# Patient Record
Sex: MALE | Race: WHITE | NOT HISPANIC OR LATINO | Employment: FULL TIME | URBAN - METROPOLITAN AREA
[De-identification: names, ages, dates, MRNs, and addresses within clinical notes are randomized per-mention and may not be internally consistent; named-entity substitution may affect disease eponyms.]

---

## 2019-03-29 ENCOUNTER — TRANSFERRED RECORDS (OUTPATIENT)
Dept: HEALTH INFORMATION MANAGEMENT | Facility: CLINIC | Age: 29
End: 2019-03-29

## 2019-05-03 NOTE — TELEPHONE ENCOUNTER
FUTURE VISIT INFORMATION      FUTURE VISIT INFORMATION:    Date: 6/10/19    Time: 7:40am    Location: [facility]  REFERRAL INFORMATION:    Referring provider:  Dr. Gennaro Eid    Referring providers clinic:  [facility]    Reason for visit/diagnosis:  Chronic Throat Clearing    RECORDS REQUESTED FROM:       Clinic name Comments Records Status Imaging Status   [facility] 3/29/19 - Office Visit / Referral - Dr. Gennaro Eid  3/13/19 - EGD Received / Epic / Care Everywhere    [facility] 3/11/19 - Office Visit - Dr. Anderson Prado Care Everywhere    Other procedures Nissen fundoplication (mentioned in [facility] note) Received / Epic            5/3/19 3:21pm - Sent inViaBillet to [facility] to see if we need Nissen Fundoplication report (mentioned by referred provider).   5/3/19 10:05am - No need for Nissen fundoplication report per in basket message from Dr. Russell to Am[facility].  PP

## 2019-06-10 ENCOUNTER — ANCILLARY PROCEDURE (OUTPATIENT)
Dept: CT IMAGING | Facility: CLINIC | Age: 29
End: 2019-06-10
Attending: OTOLARYNGOLOGY
Payer: COMMERCIAL

## 2019-06-10 ENCOUNTER — PRE VISIT (OUTPATIENT)
Dept: OTOLARYNGOLOGY | Facility: CLINIC | Age: 29
End: 2019-06-10

## 2019-06-10 ENCOUNTER — OFFICE VISIT (OUTPATIENT)
Dept: OTOLARYNGOLOGY | Facility: CLINIC | Age: 29
End: 2019-06-10
Payer: COMMERCIAL

## 2019-06-10 VITALS
DIASTOLIC BLOOD PRESSURE: 67 MMHG | WEIGHT: 211 LBS | BODY MASS INDEX: 24.41 KG/M2 | HEIGHT: 78 IN | SYSTOLIC BLOOD PRESSURE: 115 MMHG

## 2019-06-10 DIAGNOSIS — R49.0 DYSPHONIA: Primary | ICD-10-CM

## 2019-06-10 DIAGNOSIS — R49.0 DYSPHONIA: ICD-10-CM

## 2019-06-10 DIAGNOSIS — R51.9 FACIAL PAIN: ICD-10-CM

## 2019-06-10 DIAGNOSIS — R09.89 THROAT CLEARING: Primary | ICD-10-CM

## 2019-06-10 DIAGNOSIS — J38.7 IRRITABLE LARYNX SYNDROME: ICD-10-CM

## 2019-06-10 DIAGNOSIS — J38.7 IRRITABLE LARYNX: ICD-10-CM

## 2019-06-10 DIAGNOSIS — R09.89 THROAT CLEARING: ICD-10-CM

## 2019-06-10 RX ORDER — IPRATROPIUM BROMIDE 42 UG/1
2 SPRAY, METERED NASAL
Status: ON HOLD | COMMUNITY
Start: 2019-04-23 | End: 2021-05-07

## 2019-06-10 RX ORDER — MOMETASONE FUROATE MONOHYDRATE 50 UG/1
2 SPRAY, METERED NASAL
COMMUNITY
Start: 2019-04-23

## 2019-06-10 ASSESSMENT — MIFFLIN-ST. JEOR: SCORE: 2076.22

## 2019-06-10 NOTE — PATIENT INSTRUCTIONS
"After Visit Summary    Patient: Royal Hernadez  Date of Visit: 6/10/2019    Hygiene:     Systemic Hydration: internal hydration of the entire body  o Continue to sip water regularly      Topical Hydration: hydration for the surface mucosa of the larynx and vocal folds.    Please follow strategies learned on the \"Tips for Topical Hydration\" handout    Nasal Irrigation/Nasal Spray  o Continue sinus machine   o Saline spray (after running): 3 puffs in each nostril; sniff and then blow your nose    Gargling  o (gargle after running with water)  o Saline and plain water (see protocol below)    Throat clearing suppression:     Sip of water     Gargle  o With a voice  o Tilt your head side to side  o Cade chirp -  carmenkaaa carmenkaaa     Swallow    breathe + swallow (before speaking)    Breathe in through rounded lips + out with a repeated  sh  (angry ) + swallow    Suck on a lozenge with Pectin (avoid mint or menthol) or a sugar-free candy, gum, \"wet\" snacks (apples, pineapple, grapes, etc).  Also consider Xylitol products, like the Spry brand of lozenges, gum, spray    Puppy Sniffs - 2-3 small quick sniffs through the nose and exhale with  sh     massage       Kary Nagel M.M. (voice), M.A., CCC/SLP  Speech-Language Pathologist  Certificate of Vocology  [facility]  [phone]  [email or web address]  Prounouns: she/her    "

## 2019-06-10 NOTE — LETTER
6/10/2019      RE: Royal Hernadez  [address] [facility] [address]       Dear Dr. Eid:    I had the pleasure of meeting Royal Hernadez in consultation at the [facility] of the [facility] at your request, for evaluation of dysphonia, chronic cough and chronic throat-clearing. The note from our visit follows. Speech recognition software may have been used in the documentation below; input is reviewed before signature to the best of my ability. I appreciate the opportunity to participate in the care of this pleasant patient.    Please feel free to contact me with any questions.    Sincerely yours,    Jess Russell M.D., M.P.H.  , Laryngology  Director, [facility]  Otolaryngology- Head & Neck Surgery  [phone]          =====    History of Present Illness:   Royal Hernadez is a pleasant 29-year-old male with a history of anxiety and chronic rhinitis who presents with a lifelong history of dysphonia, chronic cough and chronic throat-clearing. Symptoms include increased effort to talk/sing, throat irritation, lump in throat, mucus in throat, frequent throat-clearing, poor voice quality and change in vocal pitch.      Voice    He reports a lifelong history of voice problems, possibly related to allergies.  Symptoms are present most of the time.  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   He considers this to be a very big problem for him.  He does feel that his voice is sometimes normal.       Swallowing  No concerns.       Cough/Throat-clearing  He also reports a lifelong history of cough/throat clearing.  He has previously been on reflux medications.  Endoscopy showed grade A esophagitis and a small hiatal hernia.  He feels like post-nasal drip contributes.  Cold air, sweets, caffeine, and alcohol also are triggers for him.   Nasal irrigations have not helped, but Nasonex helps somewhat with  nasal symptoms.  He often has bilateral facial pressure as well.  He drinks a lot of water and does not have a dry mouth.  His symptoms are preceded by a tickle.  They are mostly controllable.       Breathing  No concerns.       Throat discomfort  No concerns.       Reflux-type symptoms  He experiences heartburn/indigestion rarely. He is not taking reflux medications anymore.      Prior outside records were reviewed for this visit.      MEDICATIONS:     Current Outpatient Medications   Medication Sig Dispense Refill     ClonazePAM (KLONOPIN PO) Take 2 mg by mouth 2 times daily as needed.       cyclobenzaprine (FLEXERIL) 10 MG tablet Take 1 tablet by mouth 3 times daily as needed for muscle spasms. 20 tablet 0     GINKGO BILOBA MEMORY ENHANCER PO Take  by mouth.       ipratropium (ATROVENT) 0.06 % nasal spray Spray 2 sprays in nostril       mometasone (NASONEX) 50 MCG/ACT nasal spray Spray 2 sprays in nostril           ALLERGIES:    Allergies   Allergen Reactions     Methocarbamol Hives       PAST MEDICAL HISTORY:   Past Medical History:   Diagnosis Date     Anxiety      Gastro-oesophageal reflux disease         PAST SURGICAL HISTORY:   Past Surgical History:   Procedure Laterality Date     HERNIA REPAIR      at age 15       HABITS/SOCIAL HISTORY:    Social History     Tobacco Use     Smoking status: Never Smoker   Substance Use Topics     Alcohol use: Yes       FAMILY HISTORY:  No family history on file.   Father had Wilkes's esophagus.    REVIEW OF SYSTEMS:  The patient completed a comprehensive 11 point review of systems (below), which was reviewed. Positives are as noted below; pertinent findings are as noted in the HPI.     Patient Supplied Answers to Review of Systems   ENT ROS 6/10/2019   Constitutional Problems with sleep   Psychology Frequently feeling anxious   Ears, Nose, Throat Nasal congestion or drainage, Hoarseness   Allergy/Immunology Allergies or hay fever   Endocrine Thirst, Heat or cold  intolerance       PHYSICAL EXAMINATION:  General: The patient was alert and conversant, and in no acute distress.    Eyes: PERRL, conjunctiva and lids normal, sclera nonicteric.  Nose: Anterior rhinoscopy: no gross abnormalities. no  bleeding; no  mucopurulence; septum grossly normal, mild mucoid drainage and/or crusting.  Oral cavity/oropharynx: No masses or lesions. Dentition in good condition. Floor of mouth and oral tongue soft to palpation. Tongue mobility and palate elevation intact and symmetric.  Ears: Normal auricles, external auditory canals bilaterally. Visualized portions of tympanic membranes normal bilaterally.   Neck: No palpable cervical lymphadenopathy. There was no significant tenderness to palpation of the thyrohyoid space, which was mildly narrow. No obvious thyroid abnormality. Landmarks palpable.  Resp: Breathing comfortably, no stridor or stertor.  Neuro: Symmetric facial function. Other cranial nerves as documented above.  Psych: Normal affect, pleasant and cooperative.  Voice/speech: Moderate dysphonia characterized by essentially constant glottal batista.  Extremities: No cyanosis, clubbing, or edema of the upper extremities.    Intake scores  Total Score for Last Patient-Answered VHI Questionnaire  VHI Total Score 6/10/2019   VHI Total Score 16     Total Score for Last Patient-Answered EAT Questionnaire  EAT Total Score 6/10/2019   EAT Total Score 0     Total Score for Last Patient-Answered CSI Questionnaire  CSI Total Score 6/10/2019   CSI Total Score 8       PROCEDURE:   Flexible fiberoptic laryngoscopy and laryngovideostroboscopy  Indications: This procedure was warranted to evaluate the patient's laryngeal anatomy and function. Risks, benefits, and alternatives were discussed.  Description: After written informed consent was obtained, a time-out was performed to confirm patient identity, procedure, and procedure site. Topical 3% lidocaine with 0.25% phenylephrine was applied to the nasal  cavities. I performed the endoscopy and no complications were apparent. Continuous and stroboscopic light were utilized to assess routine phonation and variable frequency phonation.  Performed by: Jess Russell MD MPH  SLP: Kary Nagel MM, MA, CCC-SLP   Findings: Normal nasopharynx. Normal base of tongue, valleculae, and epiglottis. Vocal fold mobility: right: normal; left: normal. Medial edges of the vocal folds: smooth and straight. No focal mucosal lesions were observed on the true vocal folds. Glissade produced appropriate elongation. There was moderate to severe supraglottic recruitment with connected speech. Mucosa of false vocal folds, aryepiglottic folds, piriform sinuses, and posterior glottis unremarkable. Airway was patent. Response to the therapy probes was good. No focal lesions on NBI. Sticky scattered secretions.    The addition of stroboscopy allowed evaluation of the mucosal wave.   Amplitude: right: normal; left: normal. Symmetry: intermittent symmetry. Closure pattern: complete. Closure plane: at glottic level. Phase distribution: normal.        IMPRESSION AND PLAN:   Royal Hernadez presents with facial pressure as well as irritable larynx and muscle tension dysphonia. Upper endoscopy showed mild esophagitis. Symptoms have not responded to reflux management.    Recommendations:  1) I recommended speech therapy as initial primary management for dysphonia/ throat-clearing/ cough, with goals including reducing laryngeal irritability, improving laryngeal efficiency and improving respiratory/phonatory coordination.   2) Discussed consideration of sinus CT scan given the long history of symptoms and facial pressure.  He understands that a small amount of radiation is involved, but would like to proceed given the discomfort he experiences. We discussed that if there are any significant findings I may refer to a sinus specialty colleague.    He will return as needed. I appreciate the opportunity to  participate in the care of this pleasant patient.             Jess Russell MD

## 2019-06-10 NOTE — PROGRESS NOTES
Dear Dr. Eid:    I had the pleasure of meeting Royal Hernadez in consultation at the [facility] of the [facility] at your request, for evaluation of dysphonia, chronic cough and chronic throat-clearing. The note from our visit follows. Speech recognition software may have been used in the documentation below; input is reviewed before signature to the best of my ability. I appreciate the opportunity to participate in the care of this pleasant patient.    Please feel free to contact me with any questions.    Sincerely yours,    Jess Russell M.D., M.P.H.  , Laryngology  Director, [facility]  Otolaryngology- Head & Neck Surgery  [phone]          =====    History of Present Illness:   Royal Hernadez is a pleasant 29-year-old male with a history of anxiety and chronic rhinitis who presents with a lifelong history of dysphonia, chronic cough and chronic throat-clearing. Symptoms include increased effort to talk/sing, throat irritation, lump in throat, mucus in throat, frequent throat-clearing, poor voice quality and change in vocal pitch.      Voice    He reports a lifelong history of voice problems, possibly related to allergies.  Symptoms are present most of the time.  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   He considers this to be a very big problem for him.  He does feel that his voice is sometimes normal.       Swallowing  No concerns.       Cough/Throat-clearing  He also reports a lifelong history of cough/throat clearing.  He has previously been on reflux medications.  Endoscopy showed grade A esophagitis and a small hiatal hernia.  He feels like post-nasal drip contributes.  Cold air, sweets, caffeine, and alcohol also are triggers for him.   Nasal irrigations have not helped, but Nasonex helps somewhat with nasal symptoms.  He often has bilateral facial pressure as well.  He drinks a lot of  water and does not have a dry mouth.  His symptoms are preceded by a tickle.  They are mostly controllable.       Breathing  No concerns.       Throat discomfort  No concerns.       Reflux-type symptoms  He experiences heartburn/indigestion rarely. He is not taking reflux medications anymore.      Prior outside records were reviewed for this visit.      MEDICATIONS:     Current Outpatient Medications   Medication Sig Dispense Refill     ClonazePAM (KLONOPIN PO) Take 2 mg by mouth 2 times daily as needed.       cyclobenzaprine (FLEXERIL) 10 MG tablet Take 1 tablet by mouth 3 times daily as needed for muscle spasms. 20 tablet 0     GINKGO BILOBA MEMORY ENHANCER PO Take  by mouth.       ipratropium (ATROVENT) 0.06 % nasal spray Spray 2 sprays in nostril       mometasone (NASONEX) 50 MCG/ACT nasal spray Spray 2 sprays in nostril           ALLERGIES:    Allergies   Allergen Reactions     Methocarbamol Hives       PAST MEDICAL HISTORY:   Past Medical History:   Diagnosis Date     Anxiety      Gastro-oesophageal reflux disease         PAST SURGICAL HISTORY:   Past Surgical History:   Procedure Laterality Date     HERNIA REPAIR      at age 15       HABITS/SOCIAL HISTORY:    Social History     Tobacco Use     Smoking status: Never Smoker   Substance Use Topics     Alcohol use: Yes       FAMILY HISTORY:  No family history on file.   Father had Wilkes's esophagus.    REVIEW OF SYSTEMS:  The patient completed a comprehensive 11 point review of systems (below), which was reviewed. Positives are as noted below; pertinent findings are as noted in the HPI.     Patient Supplied Answers to Review of Systems   ENT ROS 6/10/2019   Constitutional Problems with sleep   Psychology Frequently feeling anxious   Ears, Nose, Throat Nasal congestion or drainage, Hoarseness   Allergy/Immunology Allergies or hay fever   Endocrine Thirst, Heat or cold intolerance       PHYSICAL EXAMINATION:  General: The patient was alert and conversant, and  in no acute distress.    Eyes: PERRL, conjunctiva and lids normal, sclera nonicteric.  Nose: Anterior rhinoscopy: no gross abnormalities. no  bleeding; no  mucopurulence; septum grossly normal, mild mucoid drainage and/or crusting.  Oral cavity/oropharynx: No masses or lesions. Dentition in good condition. Floor of mouth and oral tongue soft to palpation. Tongue mobility and palate elevation intact and symmetric.  Ears: Normal auricles, external auditory canals bilaterally. Visualized portions of tympanic membranes normal bilaterally.   Neck: No palpable cervical lymphadenopathy. There was no significant tenderness to palpation of the thyrohyoid space, which was mildly narrow. No obvious thyroid abnormality. Landmarks palpable.  Resp: Breathing comfortably, no stridor or stertor.  Neuro: Symmetric facial function. Other cranial nerves as documented above.  Psych: Normal affect, pleasant and cooperative.  Voice/speech: Moderate dysphonia characterized by essentially constant glottal batista.  Extremities: No cyanosis, clubbing, or edema of the upper extremities.    Intake scores  Total Score for Last Patient-Answered VHI Questionnaire  VHI Total Score 6/10/2019   VHI Total Score 16     Total Score for Last Patient-Answered EAT Questionnaire  EAT Total Score 6/10/2019   EAT Total Score 0     Total Score for Last Patient-Answered CSI Questionnaire  CSI Total Score 6/10/2019   CSI Total Score 8       PROCEDURE:   Flexible fiberoptic laryngoscopy and laryngovideostroboscopy  Indications: This procedure was warranted to evaluate the patient's laryngeal anatomy and function. Risks, benefits, and alternatives were discussed.  Description: After written informed consent was obtained, a time-out was performed to confirm patient identity, procedure, and procedure site. Topical 3% lidocaine with 0.25% phenylephrine was applied to the nasal cavities. I performed the endoscopy and no complications were apparent. Continuous and  stroboscopic light were utilized to assess routine phonation and variable frequency phonation.  Performed by: Jess Russell MD MPH  SLP: Kary Nagel MM, MA, CCC-SLP   Findings: Normal nasopharynx. Normal base of tongue, valleculae, and epiglottis. Vocal fold mobility: right: normal; left: normal. Medial edges of the vocal folds: smooth and straight. No focal mucosal lesions were observed on the true vocal folds. Glissade produced appropriate elongation. There was moderate to severe supraglottic recruitment with connected speech. Mucosa of false vocal folds, aryepiglottic folds, piriform sinuses, and posterior glottis unremarkable. Airway was patent. Response to the therapy probes was good. No focal lesions on NBI. Sticky scattered secretions.    The addition of stroboscopy allowed evaluation of the mucosal wave.   Amplitude: right: normal; left: normal. Symmetry: intermittent symmetry. Closure pattern: complete. Closure plane: at glottic level. Phase distribution: normal.        IMPRESSION AND PLAN:   Royal Hernadez presents with facial pressure as well as irritable larynx and muscle tension dysphonia. Upper endoscopy showed mild esophagitis. Symptoms have not responded to reflux management.    Recommendations:  1) I recommended speech therapy as initial primary management for dysphonia/ throat-clearing/ cough, with goals including reducing laryngeal irritability, improving laryngeal efficiency and improving respiratory/phonatory coordination.   2) Discussed consideration of sinus CT scan given the long history of symptoms and facial pressure.  He understands that a small amount of radiation is involved, but would like to proceed given the discomfort he experiences. We discussed that if there are any significant findings I may refer to a sinus specialty colleague.    He will return as needed. I appreciate the opportunity to participate in the care of this pleasant patient.

## 2019-06-10 NOTE — PATIENT INSTRUCTIONS
Thank you for choosing [facility] today.    Please follow-up with Dr. Russell as needed.   Dr. Russell recommends speech therapy.     If you have any further questions or concerns, call us at [phone].

## 2019-06-10 NOTE — PROGRESS NOTES
"[facility]  Yong Rivera Jr., M.D., F.A.C.S.  Jess Russell M.D., M.P.H.  Nova Melendrez, Ph.D., CCC/SLP  Kary Nagel M.M. (voice), M.A., CCC/SLP  Jeremie Lai M.M. (voice), M.A., CCC/SLP    Evaluation report    Clinician: Kary Nagel M.M. (voice), M.A., CCC/SLP  Seen in conjunction with: Dr. Russell  Referring physician:  St[place]  Patient: Royal Hernadez  Date of Visit: 6/10/2019    HISTORY  Chief complaint: Royal Hernadez is a 29 year old presenting today for evaluation of voice and throat isues.  Salient history: He has a history significant for post nasal drainage, and throat issues since he was very young.    CURRENT SYMPTOMS INCLUDE  VOICE    Vibrates more, and there is a roughness of the tone    Difficulty keeping the voice \"smooth\"    THROAT ISSUES    Never seen a speech-language pathologist; most of the time he has been treating his sinuses.    Denies throat tightness, or irritation     COUGH:  o Never very severe.  Mostly within normal limits  o his cough triggers include:  - Denies triggers    THROAT CLEARING:  o Mostly dry  o Frequent.  Has different versions - worst in the AM, then unpredictable.  o Occasionally, he will notice that he needs to clear more frequently than others.  o sinupulse in the morning.  o his throat clearing triggers include:  - Allergens  - Talking  - Dryness in the morning  - Sweets, caffeine, alcohol (makes sinuses worse, and then throat clearing in the AM)  - Caffeine also makes it worse.    GLOBUS:  o Usually feels like mucus is stuck in the throat; sensation comes and goes.   o The sensation is most often mild, mild-mod; never severe    SWALLOWING    Denies issues    RESPIRATION    Asthma as a child, but he denies any issues.    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    OTHER PERTINENT HISTORY    Otherwise unremarkable.  Please also refer to Dr. Russell's dictation.     Past Medical History:   Diagnosis Date     Anxiety      Gastro-oesophageal " "reflux disease      Past Surgical History:   Procedure Laterality Date     HERNIA REPAIR      at age 15       OBJECTIVE  PATIENT REPORTED MEASURES    Effort to talk: 5 / 10 (0-10 in which 10 represents maximal effort)    Effort to sing: n/a / 10 (0-10 in which 10 represents maximal effort)    Voice quality: 4 / 10 (0-10 in which 10 represents best possible voice)  Patient Supplied Answers To VHI Questionnaire  Voice Handicap Index (VHI-10) 6/10/2019   My voice makes it difficult for people to hear me 2   People have difficulty understanding me in a noisy room 1   My voice difficulties restrict my personal and social life.  2   I feel left out of conversations because of my voice 0   My voice problem causes me to lose income 1   I feel as though I have to strain to produce voice 2   The clarity of my voice is unpredictable 3   My voice problem upsets me 2   My voice makes me feel handicapped 1   People ask, \"What's wrong with your voice?\" 2   VHI-10 16       Patient Supplied Answers To CSI Questionnaire  Cough Severity Index (CSI) 6/10/2019   My cough is worse when I lie down 1   My coughing problem causes me to restrict my personal and social life 1   I tend to avoid places because of my cough problem 1   I feel embarrassed because of my coughing problem 2   People ask, ''What's wrong?'' because I cough a lot 3   I run out of air when I cough 0   My coughing problem affects my voice 2   My coughing problem limits my physical activity 1   My coughing problem upsets me 2   People ask me if I am sick because I cough a lot 2   CSI Score 15       Patient Supplied Answers To EAT Questionnaire  Eating Assessment Tool (EAT-10) 6/10/2019   My swallowing problem has caused me to lose weight 0   My swallowing problem interferes with my ability to go out for meals 0   Swallowing liquids takes extra effort 0   Swallowing solids takes extra effort 0   Swallowing pills takes extra effort 0   Swallowing is painful 0   The pleasure " of eating is affected by my swallowing 0   When I swallow food sticks in my throat 0   I cough when I eat 0   Swallowing is stressful 0   EAT-10 0       PERCEPTUAL EVALUATION (CPT 02770)  POSTURE / TENSION:     WNL    BREATHING:     appears within normal limits and adequate     clavicular elevation on inspiration    clavicular muscle use pattern     LARYNGEAL PALPATION:     supple musculature    no significant tenderness    VOICE:    Roughness: Mild to moderate    Breathiness: WNL    Strain: Mild     Glottal batista    Occasional pitch instability/ mild voice breaks.    Loudness    Conversational speech:  WNL    Projected speech:  WNL    Pitch:    Conversational speech:  WNL    Pitch glide: neurologically normal    Resonance:    Conversational speech:  laryngeal pharyngeal resonance    Singing vs. Speech:  n/a    CAPE-V Overall Severity:  30/100    COUGH/THROAT CLEARING:    THROAT CLEARING    Frequent    Dry    LARYNGEAL FUNCTION STUDIES (CPT 74366)  Not warranted    LARYNGEAL EXAMINATION  Procedure: Flexible endoscopy with chip-tip technology without stroboscopy, right nostril; topical anesthesia with 3% Lidocaine and 0.25% phenylephrine was applied.   Performed by: Dr. Russell   The laryngeal and pharyngeal structures were evaluated for gross appearance, mobility, function, and focal lesions / abnormalities of the associated mucosa.    All findings were within normal limits with the exception of the following salient features:     Mild to moderate thickened secretions; L>R    Moderate to severe supraglottic hyperfunction during connected speech.    THERAPY PROBES: Improvement was elicited with use of forward resonant stimuli, coordination of respiration and phonation and use of yawn sigh      Abducted view of the vocal folds.    The laryngeal exam was reviewed with  Satya, and I provided pertinent explanations, as well as written and oral information.    ASSESSMENT / PLAN  IMPRESSIONS: Royal Hernadez is a 29 year old  male who works in , presenting today with R49.0 (Dysphonia) and R68.89 (Chronic Throat Clearing), as evidenced by evaluation and the laryngeal exam.  Remarkable findings of the evaluations included mild to moderate persistent thickened secretions on the surface of the vocal folds (L> R)moderate to severe supraglottic hyperfunction, and possible intermittent level difference noted during adduction; all indicators of significant muscle tension and imbalance that could be contributing to his throat clearing issues.   Dysphonia/discomfort is accounted for by the hyperfunction and imbalanced function of the intrinsic and extrinsic laryngeal musculature  .    STIMULABILITY: results of therapy probes during perceptual and laryngeal evaluation demonstrate improvement with use of forward resonant stimuli, coordination of respiration and phonation and use of yawn sigh    RECOMMENDATIONS:     A course of speech therapy is recommended to optimize vocal technique, promote reduced discomfort, effort and fatigue and help reduce throat clear and mucosal irritation.    He demonstrates a Good prognosis for improvement given adherence to therapeutic recommendations.     Positive indicators: positive response to therapy probes diagnosis is known to respond to treatment    Negative indicators: none    DURATION / FREQUENCY: 3 one-hour sessions.  A total of 6-8 sessions may be necessary.    GOALS:  Patient goal:   To improve and maintain a healthy voice quality  To understand the problem and fix it as much as possible  To reduce his throat clearing to acceptable levels    Short-term goal(s): Within the first 4 sessions, Mr. Hernadez:  1. will demonstrate improved awareness of throat clearing / cough: acknowledging >75% of all cough events during session time with no clinician support  2. will be able to demonstrate provided throat clearing suppression and substitution strategies from memory independently with 90%  accuracy  3. will demonstrate semi-occluded vocal tract (SOVT) exercises with at least 80% accuracy with no clinician support    Long-term goal(s): In 6 months, Mr. Hernadez will:  1. Report a week of typical vocal activities, in which dysphonia that does not exceed a level of 3 out of 10, 80% of the time   2. Report a week with no more than episodes of coughing, that do not last more than 2 seconds  3. Report resolution of dysphonia, and use of optimal voice quality to meet personal, social, and professional needs, 80% of the time during a typical week of vocal activities  This treatment plan was developed with the patient who agreed with the recommendations.    _______________________________________________________________________  THERAPY NOTE (CPT 61285)  Date of Service: 6/10/2019    SUBJECTIVE / OBJECTIVE:  Please refer to my evaluation report from today's encounter for full details regarding subjective data, patient reported measures, and diagnostic findings.    THERAPEUTIC ACTIVITIES  Exercises and techniques for optimal vocal hygiene including:    Systemic hydration, including strategies for increasing daily water intake    Topical hydration - Gargling (saline and plain water), saline nasal irrigation, humidification, steam, guaifenesin to reduce the thickened secretions / laryngeal irritation.    Awareness and reduction of phonotraumatic behaviors    Moderating voice use    Substituting non-voice alternative behaviors    Avoiding cough and throat clearing    Throat clearing reduction therapy    he is most bothered by: a dry, persistent throat clear    Suppression and substitution strategies were instructed including    Swallowing substitution techniques    Breathing suppression techniques to reduce laryngeal tension    Low impact glottic coup and soft cough    Techniques to raise awareness of habitual throat clearing    Counseling and Education    Asked many questions about the nature of his symptoms, and I  answered all of these thoroughly.    Concepts of an optimal regimen for practice were instructed.  o He should use an interval schedule of practice, with brief periods of practice frequently throughout each day  o [employer] concepts of volitional practice to facilitate motor learning.    I provided an after visit summary and handouts of today's therapeutic activities to facilitate practice.    ASSESSMENT/PLAN  PROGRESS TOWARD LONG TERM GOALS:   Minimal at this point, as this is first session, but good learning today    IMPRESSIONS: R49.0 (Dysphonia) and R68.89 (Chronic Throat Clearing), and irritable larynx      PLAN: I will see Mr. Hernadez in early July for therapy    TOTAL SERVICE TIME: 60 minutes  EVALUATION OF VOICE AND RESONANCE (44053)  TREATMENT (60981)  NO CHARGE FACILITY FEE (80318)    Kary Nagel M.M. (voice), M.A., CCC/SLP  Speech-Language Pathologist  [facility] Trained Vocologist  [facility]  [phone]  [email or web address]  Prounouns: she/her

## 2019-06-10 NOTE — LETTER
"6/10/2019       RE: Royal Hernadez  [address]  [place] [address]     Dear Colleague,    Thank you for referring your patient, Royal Hernadez, to the [facility]. Please see a copy of my visit note below.    [facility] VOICE [facility]  Yong Rivera Jr., M.D., F.A.C.S.  Jess Russell M.D., M.P.H.  Nova Melendrez, Ph.D., CCC/SLP  Kary Nagel M.M. (voice), M.A., CCC/SLP  Jeremie Lai M.M. (voice), M.A., CCC/SLP    Evaluation report    Clinician: Kary Nagel M.M. (voice), M.A., CCC/SLP  Seen in conjunction with: Dr. Russell  Referring physician:  St[place]  Patient: Royal Hernadez  Date of Visit: 6/10/2019    HISTORY  Chief complaint: Royal Hernadez is a 29 year old presenting today for evaluation of voice and throat isues.  Salient history: He has a history significant for post nasal drainage, and throat issues since he was very young.    CURRENT SYMPTOMS INCLUDE  VOICE    Vibrates more, and there is a roughness of the tone    Difficulty keeping the voice \"smooth\"    THROAT ISSUES    Never seen a speech-language pathologist; most of the time he has been treating his sinuses.    Denies throat tightness, or irritation     COUGH:  o Never very severe.  Mostly within normal limits  o his cough triggers include:  - Denies triggers    THROAT CLEARING:  o Mostly dry  o Frequent.  Has different versions - worst in the AM, then unpredictable.  o Occasionally, he will notice that he needs to clear more frequently than others.  o sinupulse in the morning.  o his throat clearing triggers include:  - Allergens  - Talking  - Dryness in the morning  - Sweets, caffeine, alcohol (makes sinuses worse, and then throat clearing in the AM)  - Caffeine also makes it worse.    GLOBUS:  o Usually feels like mucus is stuck in the throat; sensation comes and goes.   o The sensation is most often mild, mild-mod; never severe    SWALLOWING    Denies issues    RESPIRATION    Asthma as a " "child, but he denies any issues.    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    OTHER PERTINENT HISTORY    Otherwise unremarkable.  Please also refer to Dr. Russell's dictation.     Past Medical History:   Diagnosis Date     Anxiety      Gastro-oesophageal reflux disease      Past Surgical History:   Procedure Laterality Date     HERNIA REPAIR      at age 15       OBJECTIVE  PATIENT REPORTED MEASURES    Effort to talk: 5 / 10 (0-10 in which 10 represents maximal effort)    Effort to sing: n/a / 10 (0-10 in which 10 represents maximal effort)    Voice quality: 4 / 10 (0-10 in which 10 represents best possible voice)  Patient Supplied Answers To VHI Questionnaire  Voice Handicap Index (VHI-10) 6/10/2019   My voice makes it difficult for people to hear me 2   People have difficulty understanding me in a noisy room 1   My voice difficulties restrict my personal and social life.  2   I feel left out of conversations because of my voice 0   My voice problem causes me to lose income 1   I feel as though I have to strain to produce voice 2   The clarity of my voice is unpredictable 3   My voice problem upsets me 2   My voice makes me feel handicapped 1   People ask, \"What's wrong with your voice?\" 2   VHI-10 16       Patient Supplied Answers To CSI Questionnaire  Cough Severity Index (CSI) 6/10/2019   My cough is worse when I lie down 1   My coughing problem causes me to restrict my personal and social life 1   I tend to avoid places because of my cough problem 1   I feel embarrassed because of my coughing problem 2   People ask, ''What's wrong?'' because I cough a lot 3   I run out of air when I cough 0   My coughing problem affects my voice 2   My coughing problem limits my physical activity 1   My coughing problem upsets me 2   People ask me if I am sick because I cough a lot 2   CSI Score 15       Patient Supplied Answers To EAT Questionnaire  Eating Assessment Tool (EAT-10) 6/10/2019   My swallowing problem has " caused me to lose weight 0   My swallowing problem interferes with my ability to go out for meals 0   Swallowing liquids takes extra effort 0   Swallowing solids takes extra effort 0   Swallowing pills takes extra effort 0   Swallowing is painful 0   The pleasure of eating is affected by my swallowing 0   When I swallow food sticks in my throat 0   I cough when I eat 0   Swallowing is stressful 0   EAT-10 0       PERCEPTUAL EVALUATION (CPT 86485)  POSTURE / TENSION:     WNL    BREATHING:     appears within normal limits and adequate     clavicular elevation on inspiration    clavicular muscle use pattern     LARYNGEAL PALPATION:     supple musculature    no significant tenderness    VOICE:    Roughness: Mild to moderate    Breathiness: WNL    Strain: Mild     Glottal batista    Occasional pitch instability/ mild voice breaks.    Loudness    Conversational speech:  WNL    Projected speech:  WNL    Pitch:    Conversational speech:  WNL    Pitch glide: neurologically normal    Resonance:    Conversational speech:  laryngeal pharyngeal resonance    Singing vs. Speech:  n/a    CAPE-V Overall Severity:  30/100    COUGH/THROAT CLEARING:    THROAT CLEARING    Frequent    Dry    LARYNGEAL FUNCTION STUDIES (CPT 83010)  Not warranted    LARYNGEAL EXAMINATION  Procedure: Flexible endoscopy with chip-tip technology without stroboscopy, right nostril; topical anesthesia with 3% Lidocaine and 0.25% phenylephrine was applied.   Performed by: Dr. Russell   The laryngeal and pharyngeal structures were evaluated for gross appearance, mobility, function, and focal lesions / abnormalities of the associated mucosa.    All findings were within normal limits with the exception of the following salient features:     Mild to moderate thickened secretions; L>R    Moderate to severe supraglottic hyperfunction during connected speech.    THERAPY PROBES: Improvement was elicited with use of forward resonant stimuli, coordination of respiration and  phonation and use of yawn sigh      Abducted view of the vocal folds.    The laryngeal exam was reviewed with Mr. Hernadez, and I provided pertinent explanations, as well as written and oral information.    ASSESSMENT / PLAN  IMPRESSIONS: Royal Hernadez is a 29 year old male who works in , presenting today with R49.0 (Dysphonia) and R68.89 (Chronic Throat Clearing), as evidenced by evaluation and the laryngeal exam.  Remarkable findings of the evaluations included mild to moderate persistent thickened secretions on the surface of the vocal folds (L> R)moderate to severe supraglottic hyperfunction, and possible intermittent level difference noted during adduction; all indicators of significant muscle tension and imbalance that could be contributing to his throat clearing issues.   Dysphonia/discomfort is accounted for by the hyperfunction and imbalanced function of the intrinsic and extrinsic laryngeal musculature  .    STIMULABILITY: results of therapy probes during perceptual and laryngeal evaluation demonstrate improvement with use of forward resonant stimuli, coordination of respiration and phonation and use of yawn sigh    RECOMMENDATIONS:     A course of speech therapy is recommended to optimize vocal technique, promote reduced discomfort, effort and fatigue and help reduce throat clear and mucosal irritation.    He demonstrates a Good prognosis for improvement given adherence to therapeutic recommendations.     Positive indicators: positive response to therapy probes diagnosis is known to respond to treatment    Negative indicators: none    DURATION / FREQUENCY: 3 one-hour sessions.  A total of 6-8 sessions may be necessary.    GOALS:  Patient goal:   To improve and maintain a healthy voice quality  To understand the problem and fix it as much as possible  To reduce his throat clearing to acceptable levels    Short-term goal(s): Within the first 4 sessions, Mr. Hernadez:  1. will demonstrate improved  awareness of throat clearing / cough: acknowledging >75% of all cough events during session time with no clinician support  2. will be able to demonstrate provided throat clearing suppression and substitution strategies from memory independently with 90% accuracy  3. will demonstrate semi-occluded vocal tract (SOVT) exercises with at least 80% accuracy with no clinician support    Long-term goal(s): In 6 months, Mr. Hernadez will:  1. Report a week of typical vocal activities, in which dysphonia that does not exceed a level of 3 out of 10, 80% of the time   2. Report a week with no more than episodes of coughing, that do not last more than 2 seconds  3. Report resolution of dysphonia, and use of optimal voice quality to meet personal, social, and professional needs, 80% of the time during a typical week of vocal activities  This treatment plan was developed with the patient who agreed with the recommendations.    _______________________________________________________________________  THERAPY NOTE (CPT 32111)  Date of Service: 6/10/2019    SUBJECTIVE / OBJECTIVE:  Please refer to my evaluation report from today's encounter for full details regarding subjective data, patient reported measures, and diagnostic findings.    THERAPEUTIC ACTIVITIES  Exercises and techniques for optimal vocal hygiene including:    Systemic hydration, including strategies for increasing daily water intake    Topical hydration - Gargling (saline and plain water), saline nasal irrigation, humidification, steam, guaifenesin to reduce the thickened secretions / laryngeal irritation.    Awareness and reduction of phonotraumatic behaviors    Moderating voice use    Substituting non-voice alternative behaviors    Avoiding cough and throat clearing    Throat clearing reduction therapy    he is most bothered by: a dry, persistent throat clear    Suppression and substitution strategies were instructed including    Swallowing substitution  techniques    Breathing suppression techniques to reduce laryngeal tension    Low impact glottic coup and soft cough    Techniques to raise awareness of habitual throat clearing    Counseling and Education    Asked many questions about the nature of his symptoms, and I answered all of these thoroughly.    Concepts of an optimal regimen for practice were instructed.  o He should use an interval schedule of practice, with brief periods of practice frequently throughout each day  o [place] concepts of volitional practice to facilitate motor learning.    I provided an after visit summary and handouts of today's therapeutic activities to facilitate practice.    ASSESSMENT/PLAN  PROGRESS TOWARD LONG TERM GOALS:   Minimal at this point, as this is first session, but good learning today    IMPRESSIONS: R49.0 (Dysphonia) and R68.89 (Chronic Throat Clearing), and irritable larynx      PLAN: I will see Mr. Hernadez in early July for therapy    TOTAL SERVICE TIME: 60 minutes  EVALUATION OF VOICE AND RESONANCE (15604)  TREATMENT (50567)  NO CHARGE FACILITY FEE (74255)    Kary Nagel M.M. (voice) MJOSEY, CCC/SLP  Speech-Language Pathologist  [facility] Trained Vocologist  [facility]  [phone]  [email or web address]  Prounouns: she/her                    Again, thank you for allowing me to participate in the care of your patient.      Sincerely,    Kary Nagel, SLP

## 2019-06-14 ENCOUNTER — TELEPHONE (OUTPATIENT)
Dept: OTOLARYNGOLOGY | Facility: CLINIC | Age: 29
End: 2019-06-14

## 2020-03-02 ENCOUNTER — HEALTH MAINTENANCE LETTER (OUTPATIENT)
Age: 30
End: 2020-03-02

## 2020-12-14 ENCOUNTER — HEALTH MAINTENANCE LETTER (OUTPATIENT)
Age: 30
End: 2020-12-14

## 2021-04-14 ENCOUNTER — TELEPHONE (OUTPATIENT)
Dept: OTHER | Facility: CLINIC | Age: 31
End: 2021-04-14

## 2021-04-14 DIAGNOSIS — I86.1 VARICOCELE: Primary | ICD-10-CM

## 2021-04-14 NOTE — TELEPHONE ENCOUNTER
Referral from Dr. Hidalgo with [facility].  Patient had history of left surgical ligation of varicocele when he was 15 year old.  Patient having pain and swelling.  No recent ultrasound.   Will order testicular/scrotum ultrasound with doppler for evaluation of varicocele. Will arrange consult once imaging has been completed.  Patient agrees to plan.  Rhiannon Hamilton RN  IR nurse clinician  [phone]

## 2021-04-15 ENCOUNTER — HOSPITAL ENCOUNTER (OUTPATIENT)
Dept: ULTRASOUND IMAGING | Facility: CLINIC | Age: 31
Discharge: HOME | End: 2021-04-15
Attending: RADIOLOGY | Admitting: RADIOLOGY
Payer: COMMERCIAL

## 2021-04-15 DIAGNOSIS — I86.1 VARICOCELE: ICD-10-CM

## 2021-04-15 PROCEDURE — 76870 US EXAM SCROTUM: CPT

## 2021-04-18 ENCOUNTER — HEALTH MAINTENANCE LETTER (OUTPATIENT)
Age: 31
End: 2021-04-18

## 2021-04-19 ENCOUNTER — OFFICE VISIT (OUTPATIENT)
Dept: OTHER | Facility: CLINIC | Age: 31
End: 2021-04-19
Attending: RADIOLOGY
Payer: COMMERCIAL

## 2021-04-19 VITALS — HEART RATE: 84 BPM | SYSTOLIC BLOOD PRESSURE: 119 MMHG | DIASTOLIC BLOOD PRESSURE: 80 MMHG

## 2021-04-19 DIAGNOSIS — I86.1 VARICOCELE: Primary | ICD-10-CM

## 2021-04-19 PROCEDURE — G0463 HOSPITAL OUTPT CLINIC VISIT: HCPCS

## 2021-04-19 RX ORDER — LEVALBUTEROL TARTRATE 45 UG/1
AEROSOL, METERED ORAL
COMMUNITY
Start: 2021-03-18

## 2021-04-19 RX ORDER — CETIRIZINE HYDROCHLORIDE 10 MG/1
10 TABLET ORAL DAILY
COMMUNITY

## 2021-04-19 RX ORDER — HEPARIN SODIUM 200 [USP'U]/100ML
1 INJECTION, SOLUTION INTRAVENOUS CONTINUOUS PRN
Status: CANCELLED | OUTPATIENT
Start: 2021-04-19

## 2021-04-19 NOTE — RESULT ENCOUNTER NOTE
Will discuss during 4/19/2021 consult with Dr. Ford.  Rhiannon Hamilton RN  IR nurse clinician  [phone]

## 2021-04-19 NOTE — PROGRESS NOTES
"Royal Hernadez is a 31 year old male who presents for:  No chief complaint on file.       Vitals:    Vitals:    04/19/21 1332 04/19/21 1333   BP: 125/89 119/80   BP Location: Left arm Right arm   Patient Position: Chair Chair   Cuff Size: Adult Regular Adult Regular   Pulse: 72 84       BMI:  Estimated body mass index is 23.77 kg/m  as calculated from the following:    Height as of 6/10/19: 6' 7\" (2.007 m).    Weight as of 6/10/19: 211 lb (95.7 kg).    Pain Score:  Data Unavailable        Kenna Ferguson MA    "

## 2021-04-20 NOTE — PATIENT INSTRUCTIONS
Patient scheduled for left spermatic embolization at [facility] with Dr. Ford  Patient will need a pre-op and COVID test within 4 days.  Will need a .

## 2021-04-20 NOTE — PROGRESS NOTES
VASCULAR OUTPATIENT CONSULT OR VISIT  PHYSICIAN:  Dr. Bear Ford      LOCATION: [facility]    Royal Hernadez   Medical Record #:  [id number]  YOB: 1990  Age:  31 year old     Date of Service: 4/19/2021    PRIMARY CARE PROVIDER: Anderson Prado      HPI:  Royal Hernadez is a 31 year old male who was evaluated today for left varicocele.  The patient was referred by Dr. Hidalgo with [facility].  The patient has a history left varicocele with surgical treatment when he was 16 years old.  He has been noticing significant aching with pain in the left scrotum.  The pain does worsen with activity and standing long periods of time.  He has tried different supportive underwear and medication with no relief.  He states he has a constant ache with periods of significant pain.  It is affecting his lifestyle, he is a runner and it has become quite limiting.    PHH:    Past Medical History:   Diagnosis Date     Anxiety      Gastro-oesophageal reflux disease         Past Surgical History:   Procedure Laterality Date     HERNIA REPAIR      at age 15       ALLERGIES:  Methocarbamol    MEDS:    Current Outpatient Medications:      cetirizine (ZYRTEC) 10 MG tablet, Take 10 mg by mouth daily, Disp: , Rfl:      levalbuterol (XOPENEX HFA) 45 MCG/ACT inhaler, levalbuterol HFA 45 mcg/actuation aerosol inhaler, Disp: , Rfl:      mometasone (NASONEX) 50 MCG/ACT nasal spray, Spray 2 sprays in nostril, Disp: , Rfl:      ClonazePAM (KLONOPIN PO), Take 2 mg by mouth 2 times daily as needed., Disp: , Rfl:      cyclobenzaprine (FLEXERIL) 10 MG tablet, Take 1 tablet by mouth 3 times daily as needed for muscle spasms. (Patient not taking: Reported on 4/19/2021), Disp: 20 tablet, Rfl: 0     GINKGO BILOBA MEMORY ENHANCER PO, Take  by mouth., Disp: , Rfl:      ipratropium (ATROVENT) 0.06 % nasal spray, Spray 2 sprays in nostril, Disp: , Rfl:      PANTOPRAZOLE SODIUM PO, Take 40 mg by mouth., Disp: , Rfl:     SOCIAL HABITS:     History   Smoking Status     Never Smoker   Smokeless Tobacco     Never Used     Social History    Substance and Sexual Activity      Alcohol use: Yes      History   Drug Use Unknown     Comment: ocass use       FAMILY HISTORY:  No family history on file.    REVIEW OF SYSTEMS:    A 12 point ROS was reviewed and except for what is listed in the HPI above, all others are negative    PE:  /80 (BP Location: Right arm, Patient Position: Chair, Cuff Size: Adult Regular)   Pulse 84   Wt Readings from Last 1 Encounters:   06/10/19 211 lb (95.7 kg)     There is no height or weight on file to calculate BMI.    EXAM:  GENERAL: This is a well-developed 31 year old male who appears his stated age  EYES: Grossly normal.  MOUTH: Buccal mucosa normal   MUSCULOSKELETAL: Grossly normal and both lower extremities are intact.  HEME/LYMPH: No lymphedema  NEUROLOGIC: Focally intact, Alert and oriented x 3.   PSYCH: appropriate affect  INTEGUMENT: No open lesions or ulcers        DIAGNOSTIC STUDIES:     Images:  Us Testicular & Scrotum W Doppler Ltd    Addendum Date: 4/19/2021    Dilated veins in the upper and mid aspects of the left testicle. Some of these have diameters of 3 mm that increase with Valsalva maneuver. These would be consistent with a varicocele. ABIOLA CARDONA MD    Result Date: 4/19/2021  TESTICULAR ULTRASOUND  4/15/2021 12:49 PM HISTORY: Evaluation of varicoceles, history of surgical ligation years ago. Patient having pain in the left testicle. Varicocele. COMPARISON: None. TECHNIQUE: Ultrasound gray scale, color Doppler flow, and Doppler spectral waveform analysis performed. FINDINGS: There is homogeneous normal echotexture throughout the bilateral testes. The left testicle measures 5.1 x 3.2 x 2.4 cm.  The right testicle measures 4.9 x 3.6 x 2.4 cm. The right epididymis is unremarkable.  The left epididymis is unremarkable.  Doppler evaluation shows normal arterial waveforms to the testes bilaterally. There  is no hydrocele. There is no varicocele. More numerous than typically seen vessels are seen on the left, none of which appear above 2 mm. There is no mass or abnormal calcifications.     IMPRESSION: More numerous vessels than typically seen on the left, none of which meet size criteria for varicocele. CELSO BOLAND MD    LABS:      No results found for: NA, BUN, CREATININE, GLUCOSE, HGB, PLT, BNP, INR     Assessment/plan:  This is a pleasant 31-year-old male who presents today with significant pain and swelling in the left scrotum due to ultrasound confirmed varicocele.  We discussed the results of his ultrasound.  He has numerous dilated veins in the upper and mid testicle.  These vessels do increased to 3 mm with Valsalva maneuver.  Plan: I recommended the patient undergo a left spermatic embolization.  This should alleviate most or all of his symptoms.  The patient is eager to proceed.  We will schedule him to be done in the near future at [facility].  The risks and the benefits were discussed with the patient.  Total time spent on 4/19/2021 was 30 minutes, including time spent counseling the patient, performing a medically appropriate evaluation, reviewing prior medical history, ordering medications and tests, documenting clinical information in the medical record, and communication of results.   This was a in person visit in which 30 minutes of  total time was spent (either in face-to-face or non-face-to-face time).      Dr. Bear Ford   Interventional Radiology  Pager# [phone]  [facility]

## 2021-04-26 DIAGNOSIS — Z11.59 ENCOUNTER FOR SCREENING FOR OTHER VIRAL DISEASES: ICD-10-CM

## 2021-05-03 ENCOUNTER — HOSPITAL ENCOUNTER (OUTPATIENT)
Dept: LAB | Facility: CLINIC | Age: 31
Discharge: HOME | End: 2021-05-03
Attending: RADIOLOGY | Admitting: RADIOLOGY
Payer: COMMERCIAL

## 2021-05-03 DIAGNOSIS — Z11.59 ENCOUNTER FOR SCREENING FOR OTHER VIRAL DISEASES: ICD-10-CM

## 2021-05-03 LAB
LABORATORY COMMENT REPORT: NORMAL
SARS-COV-2 RNA RESP QL NAA+PROBE: NEGATIVE
SARS-COV-2 RNA RESP QL NAA+PROBE: NORMAL
SPECIMEN SOURCE: NORMAL
SPECIMEN SOURCE: NORMAL

## 2021-05-03 PROCEDURE — U0005 INFEC AGEN DETEC AMPLI PROBE: HCPCS | Performed by: RADIOLOGY

## 2021-05-03 PROCEDURE — U0003 INFECTIOUS AGENT DETECTION BY NUCLEIC ACID (DNA OR RNA); SEVERE ACUTE RESPIRATORY SYNDROME CORONAVIRUS 2 (SARS-COV-2) (CORONAVIRUS DISEASE [COVID-19]), AMPLIFIED PROBE TECHNIQUE, MAKING USE OF HIGH THROUGHPUT TECHNOLOGIES AS DESCRIBED BY CMS-2020-01-R: HCPCS | Performed by: RADIOLOGY

## 2021-05-07 ENCOUNTER — HOSPITAL ENCOUNTER (OUTPATIENT)
Facility: CLINIC | Age: 31
Discharge: HOME | End: 2021-05-07
Attending: RADIOLOGY | Admitting: RADIOLOGY
Payer: COMMERCIAL

## 2021-05-07 ENCOUNTER — HOSPITAL ENCOUNTER (OUTPATIENT)
Dept: INTERVENTIONAL RADIOLOGY/VASCULAR | Facility: CLINIC | Age: 31
End: 2021-05-07
Attending: RADIOLOGY | Admitting: RADIOLOGY
Payer: COMMERCIAL

## 2021-05-07 VITALS
BODY MASS INDEX: 23.77 KG/M2 | SYSTOLIC BLOOD PRESSURE: 125 MMHG | HEIGHT: 78 IN | RESPIRATION RATE: 16 BRPM | HEART RATE: 63 BPM | OXYGEN SATURATION: 99 % | TEMPERATURE: 96.9 F | DIASTOLIC BLOOD PRESSURE: 88 MMHG

## 2021-05-07 VITALS
RESPIRATION RATE: 15 BRPM | SYSTOLIC BLOOD PRESSURE: 116 MMHG | HEART RATE: 64 BPM | OXYGEN SATURATION: 91 % | DIASTOLIC BLOOD PRESSURE: 73 MMHG

## 2021-05-07 DIAGNOSIS — I86.1 LEFT VARICOCELE: ICD-10-CM

## 2021-05-07 DIAGNOSIS — I86.1 VARICOCELE: ICD-10-CM

## 2021-05-07 DIAGNOSIS — I86.1 LEFT VARICOCELE: Primary | ICD-10-CM

## 2021-05-07 LAB
APTT PPP: 29 SEC (ref 22–37)
CREAT SERPL-MCNC: 1.02 MG/DL (ref 0.66–1.25)
ERYTHROCYTE [DISTWIDTH] IN BLOOD BY AUTOMATED COUNT: 12.8 % (ref 10–15)
GFR SERPL CREATININE-BSD FRML MDRD: >90 ML/MIN/{1.73_M2}
HCT VFR BLD AUTO: 44.3 % (ref 40–53)
HGB BLD-MCNC: 15.1 G/DL (ref 13.3–17.7)
INR PPP: 1.05 (ref 0.86–1.14)
MCH RBC QN AUTO: 31.1 PG (ref 26.5–33)
MCHC RBC AUTO-ENTMCNC: 34.1 G/DL (ref 31.5–36.5)
MCV RBC AUTO: 91 FL (ref 78–100)
PLATELET # BLD AUTO: 229 10E9/L (ref 150–450)
RBC # BLD AUTO: 4.85 10E12/L (ref 4.4–5.9)
WBC # BLD AUTO: 6 10E9/L (ref 4–11)

## 2021-05-07 PROCEDURE — 82565 ASSAY OF CREATININE: CPT | Performed by: RADIOLOGY

## 2021-05-07 PROCEDURE — 272N000116 HC CATH CR1

## 2021-05-07 PROCEDURE — 258N000003 HC RX IP 258 OP 636: Performed by: RADIOLOGY

## 2021-05-07 PROCEDURE — 85730 THROMBOPLASTIN TIME PARTIAL: CPT | Performed by: RADIOLOGY

## 2021-05-07 PROCEDURE — 272N000196 HC ACCESSORY CR5

## 2021-05-07 PROCEDURE — 85027 COMPLETE CBC AUTOMATED: CPT | Performed by: RADIOLOGY

## 2021-05-07 PROCEDURE — 999N000163 HC STATISTIC SIMPLE TUBE INSERTION/CHARGE, PORT, CATH, FISTULOGRAM

## 2021-05-07 PROCEDURE — 250N000013 HC RX MED GY IP 250 OP 250 PS 637: Performed by: NURSE PRACTITIONER

## 2021-05-07 PROCEDURE — 272N000127 HC CATH CR16

## 2021-05-07 PROCEDURE — 250N000011 HC RX IP 250 OP 636: Performed by: RADIOLOGY

## 2021-05-07 PROCEDURE — C1769 GUIDE WIRE: HCPCS

## 2021-05-07 PROCEDURE — 255N000002 HC RX 255 OP 636: Performed by: RADIOLOGY

## 2021-05-07 PROCEDURE — 36591 DRAW BLOOD OFF VENOUS DEVICE: CPT

## 2021-05-07 PROCEDURE — 85610 PROTHROMBIN TIME: CPT | Performed by: RADIOLOGY

## 2021-05-07 PROCEDURE — 250N000011 HC RX IP 250 OP 636: Performed by: NURSE PRACTITIONER

## 2021-05-07 PROCEDURE — 36012 PLACE CATHETER IN VEIN: CPT

## 2021-05-07 PROCEDURE — 999N000012 HC STATISTIC ANGIOGRAM, STENT, VERTEBRO PLASTY

## 2021-05-07 PROCEDURE — 36415 COLL VENOUS BLD VENIPUNCTURE: CPT

## 2021-05-07 PROCEDURE — 250N000009 HC RX 250: Performed by: RADIOLOGY

## 2021-05-07 PROCEDURE — 250N000009 HC RX 250: Performed by: NURSE PRACTITIONER

## 2021-05-07 PROCEDURE — 272N000634 IR SPERMATIC VEIN EMBOLIZATION

## 2021-05-07 PROCEDURE — 272N000566 HC SHEATH CR3

## 2021-05-07 RX ORDER — OMEGA-3/DHA/EPA/FISH OIL 60 MG-90MG
1000 CAPSULE ORAL
COMMUNITY

## 2021-05-07 RX ORDER — OXYCODONE AND ACETAMINOPHEN 5; 325 MG/1; MG/1
1-2 TABLET ORAL EVERY 6 HOURS PRN
Qty: 6 TABLET | Refills: 0 | Status: SHIPPED | OUTPATIENT
Start: 2021-05-07 | End: 2021-07-05

## 2021-05-07 RX ORDER — SODIUM TETRADECYL SULFATE 30 MG/ML
1-5 INJECTION, SOLUTION INTRAVENOUS ONCE
Status: DISCONTINUED | OUTPATIENT
Start: 2021-05-07 | End: 2021-05-07

## 2021-05-07 RX ORDER — SODIUM CHLORIDE 9 MG/ML
INJECTION, SOLUTION INTRAVENOUS CONTINUOUS
Status: DISCONTINUED | OUTPATIENT
Start: 2021-05-07 | End: 2021-05-07 | Stop reason: HOSPADM

## 2021-05-07 RX ORDER — NITROGLYCERIN 5 MG/ML
100-500 VIAL (ML) INTRAVENOUS EVERY 5 MIN PRN
Status: DISCONTINUED | OUTPATIENT
Start: 2021-05-07 | End: 2021-05-08 | Stop reason: HOSPADM

## 2021-05-07 RX ORDER — FAMOTIDINE 20 MG
2000 TABLET ORAL
COMMUNITY

## 2021-05-07 RX ORDER — IOPAMIDOL 612 MG/ML
100 INJECTION, SOLUTION INTRAVASCULAR ONCE
Status: COMPLETED | OUTPATIENT
Start: 2021-05-07 | End: 2021-05-07

## 2021-05-07 RX ORDER — FLUMAZENIL 0.1 MG/ML
0.2 INJECTION, SOLUTION INTRAVENOUS
Status: DISCONTINUED | OUTPATIENT
Start: 2021-05-07 | End: 2021-05-07 | Stop reason: HOSPADM

## 2021-05-07 RX ORDER — NALOXONE HYDROCHLORIDE 0.4 MG/ML
0.2 INJECTION, SOLUTION INTRAMUSCULAR; INTRAVENOUS; SUBCUTANEOUS
Status: DISCONTINUED | OUTPATIENT
Start: 2021-05-07 | End: 2021-05-07 | Stop reason: HOSPADM

## 2021-05-07 RX ORDER — SODIUM TETRADECYL SULFATE 30 MG/ML
1-5 INJECTION, SOLUTION INTRAVENOUS ONCE
Status: COMPLETED | OUTPATIENT
Start: 2021-05-07 | End: 2021-05-07

## 2021-05-07 RX ORDER — FENTANYL CITRATE 50 UG/ML
25-50 INJECTION, SOLUTION INTRAMUSCULAR; INTRAVENOUS EVERY 5 MIN PRN
Status: DISCONTINUED | OUTPATIENT
Start: 2021-05-07 | End: 2021-05-07 | Stop reason: HOSPADM

## 2021-05-07 RX ORDER — NALOXONE HYDROCHLORIDE 0.4 MG/ML
0.4 INJECTION, SOLUTION INTRAMUSCULAR; INTRAVENOUS; SUBCUTANEOUS
Status: DISCONTINUED | OUTPATIENT
Start: 2021-05-07 | End: 2021-05-07 | Stop reason: HOSPADM

## 2021-05-07 RX ORDER — ACETAMINOPHEN 500 MG
500 TABLET ORAL EVERY 6 HOURS PRN
Status: DISCONTINUED | OUTPATIENT
Start: 2021-05-07 | End: 2021-05-07 | Stop reason: HOSPADM

## 2021-05-07 RX ORDER — CALCIUM CITRATE/VITAMIN D3 200MG-6.25
TABLET ORAL
COMMUNITY

## 2021-05-07 RX ORDER — LIDOCAINE 40 MG/G
CREAM TOPICAL
Status: DISCONTINUED | OUTPATIENT
Start: 2021-05-07 | End: 2021-05-07 | Stop reason: HOSPADM

## 2021-05-07 RX ORDER — HEPARIN SODIUM 200 [USP'U]/100ML
1 INJECTION, SOLUTION INTRAVENOUS CONTINUOUS PRN
Status: DISCONTINUED | OUTPATIENT
Start: 2021-05-07 | End: 2021-05-08 | Stop reason: HOSPADM

## 2021-05-07 RX ADMIN — MIDAZOLAM HYDROCHLORIDE 1 MG: 1 INJECTION, SOLUTION INTRAMUSCULAR; INTRAVENOUS at 15:45

## 2021-05-07 RX ADMIN — NITROGLYCERIN 200 MCG: 10 INJECTION INTRAVENOUS at 15:44

## 2021-05-07 RX ADMIN — TETRADECYL HYDROGEN SULFATE (ESTER) 1 ML: 30 INJECTION, SOLUTION INTRAVENOUS at 16:43

## 2021-05-07 RX ADMIN — FENTANYL CITRATE 50 MCG: 50 INJECTION, SOLUTION INTRAMUSCULAR; INTRAVENOUS at 16:34

## 2021-05-07 RX ADMIN — MIDAZOLAM HYDROCHLORIDE 1 MG: 1 INJECTION, SOLUTION INTRAMUSCULAR; INTRAVENOUS at 15:27

## 2021-05-07 RX ADMIN — MIDAZOLAM HYDROCHLORIDE 1 MG: 1 INJECTION, SOLUTION INTRAMUSCULAR; INTRAVENOUS at 15:33

## 2021-05-07 RX ADMIN — FENTANYL CITRATE 50 MCG: 50 INJECTION, SOLUTION INTRAMUSCULAR; INTRAVENOUS at 16:17

## 2021-05-07 RX ADMIN — IOPAMIDOL 47 ML: 612 INJECTION, SOLUTION INTRAVENOUS at 16:45

## 2021-05-07 RX ADMIN — FENTANYL CITRATE 50 MCG: 50 INJECTION, SOLUTION INTRAMUSCULAR; INTRAVENOUS at 15:26

## 2021-05-07 RX ADMIN — FENTANYL CITRATE 50 MCG: 50 INJECTION, SOLUTION INTRAMUSCULAR; INTRAVENOUS at 15:33

## 2021-05-07 RX ADMIN — FENTANYL CITRATE 50 MCG: 50 INJECTION, SOLUTION INTRAMUSCULAR; INTRAVENOUS at 15:45

## 2021-05-07 RX ADMIN — MIDAZOLAM HYDROCHLORIDE 1 MG: 1 INJECTION, SOLUTION INTRAMUSCULAR; INTRAVENOUS at 16:16

## 2021-05-07 RX ADMIN — MIDAZOLAM HYDROCHLORIDE 1 MG: 1 INJECTION, SOLUTION INTRAMUSCULAR; INTRAVENOUS at 15:39

## 2021-05-07 RX ADMIN — FENTANYL CITRATE 50 MCG: 50 INJECTION, SOLUTION INTRAMUSCULAR; INTRAVENOUS at 16:27

## 2021-05-07 RX ADMIN — SODIUM CHLORIDE: 9 INJECTION, SOLUTION INTRAVENOUS at 12:41

## 2021-05-07 RX ADMIN — ACETAMINOPHEN 500 MG: 500 TABLET, FILM COATED ORAL at 18:56

## 2021-05-07 RX ADMIN — HEPARIN SODIUM 2 BAG: 200 INJECTION, SOLUTION INTRAVENOUS at 15:06

## 2021-05-07 RX ADMIN — FENTANYL CITRATE 50 MCG: 50 INJECTION, SOLUTION INTRAMUSCULAR; INTRAVENOUS at 15:39

## 2021-05-07 RX ADMIN — LIDOCAINE HYDROCHLORIDE 7 ML: 10 INJECTION, SOLUTION INFILTRATION; PERINEURAL at 16:43

## 2021-05-07 NOTE — PROGRESS NOTES
Care Suites Admission Nursing Note    Patient Information  Name: Royal Hernadez  Age: 31 year old  Reason for admission: spermatic embolization  Care Suites arrival time: 1140      Patient Admission/Assessment   Pre-procedure assessment complete: Yes  If abnormal assessment/labs, provider notified: N/A  NPO: Yes  Medications held per instructions/orders: N/A  Consent: obtained  If applicable, pregnancy test status: declined  Patient oriented to room: Yes  Education/questions answered: Yes  Plan/other: proceed with 1300 spermatic embolization    Discharge Planning  Discharge name/phone number: Ritu  Overnight post sedation caregiver: Ritu  Discharge location: home    Vee Falcon RN

## 2021-05-07 NOTE — DISCHARGE INSTRUCTIONS
Spermatic Vein Embolization Discharge Instructions  After you go home:      Have an adult stay with you until tomorrow.    Drink extra fluids for 2 days.    You may resume your normal diet.    No smoking       For 24 hours - due to the sedation you received:    Relax and take it easy.    Do NOT make any important or legal decisions.    Do NOT drive or operate machines at home or at work.    Do NOT drink alcohol.    Care of Neck Puncture Site:      For the first 24 hrs - check the puncture site every 1-2 hours while awake.    Remove the bandaid after 24 hours. If there is minor oozing, apply another bandaid and remove it after 12 hours.    It is normal to have a small bruise or soreness at the site.    You may shower tomorrow.  Do NOT take a bath, or use a hot tub or pool for at least 3 days. Do NOT scrub the site. Do not use lotion or powder near the puncture site.    Activity:            For 2 days:    For the next 7 days - Do not have sex    Do NOT do any heavy activity such as exercise, lifting, or straining.     No housework, yard work or any activity that make you sweat    Do NOT lift more than 10 pounds    Avoid heavy lifting or the overuse of your shoulder for three days.           Bleeding:      If you start bleeding from the site in your neck, sit down and press gently on the site for 10 minutes.     Once bleeding stops, sit still for 2 hours.     [facility] as soon as you can.       Call 911 right away if you have heavy bleeding or bleeding that does not stop.      Medicines:      If you are on Metformin (Glucophage) and your GFR (kidney function level) is >30, you may continue taking your Metformin.    If you are on Metformin (Glucophage) and your GFR (kidney function level) is <30, do not restart the Metformin for 48 hours after your procedure. Check with your primary care giver before restarting the Metformin to see if you need to have blood drawn to recheck your kidney function  (GFR).    If you are taking an antiplatelet medication such as Plavix, do not stop taking it until you talk to your provider.       Take your medications, including blood thinners, unless your provider tells you not to.      If you take Coumadin (Warfarin), have your INR checked by your provider in  3-5 days. Call your clinic to schedule this.    If you have stopped any medicines, check with your provider about when to restart them.    Follow Up Appointments:      Follow up with [facility] as directed.    The clinic will call you in one month to set up an ultrasound.   Call the clinic if:      You have pain or trouble passing urine (urinating)    You have increased pain or a large or growing hard lump around the site.    The site is red, swollen, hot or tender.    Blood or fluid is draining from the site.    You have chills or a fever greater than 101 F (38 C).    You have hives, a rash or unusual itching.    Any questions or concerns.              Other Instructions:      It is normal to have swelling, pain or tingling in the scrotum. This may last up      to a week.    The symptoms you had before treatment may take up to 4 weeks to go away.      If you have questions or your original symptoms do not improve, call:         [facility] @ [phone]

## 2021-05-07 NOTE — PRE-PROCEDURE
GENERAL PRE-PROCEDURE:   Procedure:  Venogram, spermatic vein embolization with intravenous moderate sedation   Date/Time:  5/7/2021 12:16 PM    Written consent obtained?: Yes    Risks and benefits: Risks, benefits and alternatives were discussed    Consent given by:  Patient  Patient states understanding of procedure being performed: Yes    Patient's understanding of procedure matches consent: Yes    Procedure consent matches procedure scheduled: Yes    Expected level of sedation:  Moderate  Appropriately NPO:  Yes  ASA Class:  Class 1- healthy patient  Mallampati  :  Grade 1- soft palate, uvula, tonsillar pillars, and posterior pharyngeal wall visible  Lungs:  Lungs clear with good breath sounds bilaterally  Heart:  Normal heart sounds and rate  History & Physical reviewed:  History and physical reviewed and no updates needed  Statement of review:  I have reviewed the lab findings, diagnostic data, medications, and the plan for sedation     <<-----Click on this checkbox to enter Procedure Revision or removal of ventriculoperitoneal shunt  03/01/2019    Active  CONRADO

## 2021-05-07 NOTE — PROGRESS NOTES
PATIENT/VISITOR WELLNESS SCREENING    Step 1 Patient Screening    1. In the last month, have you been in contact with someone who was confirmed or suspected to have Coronavirus/COVID-19? No    2. Do you have the following symptoms?  Fever/Chills? No   Cough? No   Shortness of breath? No   New loss of taste or smell? No  Sore throat? No  Muscle or body aches? No  Headaches? No  Fatigue? No  Vomiting or diarrhea? No  Step 3 Refer to logic grid below for actions    NO SYMPTOM(S)    ACTIONS:  1. Standard rooming process  2. Provider to assess per normal protocol  3. Implement precautions as needed and per guidelines     POSITIVE SYMPTOM(S)  If positive for ANY of the following symptoms: fever, cough, shortness of breath, rash    ACTION:  1. Continue to have the patient wear a mask   2. Room patient as soon as possible  3. Don appropriate PPE when entering room  4. Provider evaluation

## 2021-05-07 NOTE — IR NOTE
Patient Name: Royal Hernadez  Medical Record Number: [id number]  Today's Date: May 7, 2021    Start Time: 1526  End of procedure time: 1641  Procedure: venogram, spermatic vein embolization with intravenous moderate sedation  Report given to: Jaleel  Time pt departs:  1655    Other Notes: Pt into IR suite 1 via cart IVF infusing. Pt awake and alert. To table in supine position prepped and draped with 2%. VSS. Tele NSR. Dr. Ford in room. Time out and procedure started. Pt tolerated procedure well. Debrief with Dr. Ford. Pressure held until hemostasis achieved. Dressing CDI. No complications. Pt transferred back to CS. Report given to CS RN     Medications: Last sedation given at 1633    Versed 5mg  Fentanyl 350mcg  Lidocaine 1% 7ml  Sotradecol 1ml  Nitroglycerin 200mcg    Isabella Parada RN

## 2021-05-07 NOTE — PROGRESS NOTES
Care Suites Discharge Nursing Note    Patient Information  Name: Royal Hernadez  Age: 31 year old    Discharge Education:  Discharge instructions reviewed: Yes-yamile Marmolejo  Additional education/resources provided: n/a  Patient/patient representative verbalizes understanding: Yes  Patient discharging on new medications: Yes  Medication education completed: Yes    Discharge Plans:   Discharge location: home  Discharge ride contacted: Yes  Approximate discharge time: 1905    Discharge Criteria:  Discharge criteria met and vital signs stable: Yes    Patient Belongs:  Patient belongings returned to patient: Yes    Vee Falcon RN

## 2021-05-10 ENCOUNTER — TELEPHONE (OUTPATIENT)
Dept: OTHER | Facility: CLINIC | Age: 31
End: 2021-05-10

## 2021-05-10 DIAGNOSIS — I86.1 VARICOCELE: Primary | ICD-10-CM

## 2021-05-10 NOTE — TELEPHONE ENCOUNTER
Contacted patient.  Doing well, having some aching on the left side over the weekend but improving. Puncture site without concerns. Discussed each day should be improvement.  No strenuous exercise for 1 week, and patient should be symptom free.  Will see patient in 3 months with follow up ultrasound.  Rhiannon Hamilton RN  IR nurse clinician  [phone]

## 2021-07-02 NOTE — PROGRESS NOTES
CHIEF COMPLAINT: Right anterior knee pain    DIAGNOSIS: Right knee patellar tendinopathy/tendinitis and quadriceps tendinopathy/tendinitis.    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 Symptoms started in the summer of . There is not a precipitating event. He states he is a marathon runner, but over the past year has had worsening pain where he cannot run more than 2-3 miles. The pain is located to the anterior right knee, just superior and inferior to patella. Worst pain is rated a 6-7 of 10, and current pain is rated at 3 of 10. Symptoms are worsened by running, walking. Symptoms are improved with patellar tendon straps. Patient has tried patellar strap braces, rest, decreased mileage, Theragun, Naproxen, ice with little relief.  No other concerns or complaints at this time.    PAST MEDICAL HISTORY:  1. Osgood schlatter Disease     CURRENT MEDICATIONS:  1. Nasonex  2. Zyrtec  3. Levalbuterol   4. Multivitamin    ALLERGIES:   Allergies   Allergen Reactions     Methocarbamol Hives     PAST SURGICAL HISTORY:  1. Varicocele embolization    FAMILY HISTORY: No known family history of bleeding, clotting, or anesthesia related complications.    SOCIAL HISTORY: Patient lives in [place], [place] with his wife and  child. 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     TOXIC HABITS:  I spoke with Royal today regarding tobacco use and they informed me that they do not use any tobacco products..    REVIEW OF SYSTEMS:  General: Denies fevers, chills, or night sweats.  Skin: Denies rashes or lesions.  Head: Denies headache or dizziness.  Eyes: Denies vision changes or eye pain.  Ears: Denies ear pain or decreased hearing.  Nose: Denies nose bleeding or sinus pain.  Mouth & Throat: Denies bleeding gums or sore throat.  Neck: Denies neck pain or  "stiffness.  Respiratory: Denies cough, wheezing, sob, or hemoptysis.  Cardiovascular: Denies chest pain, chest pressure, or palpitations.   Gastrointestinal: Denies abdominal pain, nausea, vomiting, diarrhea, or constipation.  Genitourinary: Denies difficulty or pain with urination.   Musculoskeletal: As noted above in the HPI, otherwise normal.  Neuro: Denies paralysis, weakness, paresthesias, or speech difficulty.  Lymphatics: Denies lymphadenopathy.  Psych: Denies anxiety, sadness, or irritability.    PHYSICAL EXAM:  Patient is 6' 7\" and weighs 228 lbs 9.6 oz. /76   Ht 2.007 m (6' 7\")   Wt 103.7 kg (228 lb 9.6 oz)   BMI 25.75 kg/m    Constitutional: Well-developed, well-nourished, healthy appearing male.  Psychiatric:  Oriented to person, place and time.  Mood and affect congruent.  Skin: Warm, dry, and without rashes.  HEENT: Normocephalic, atraumatic. Extraocular movements intact. Moist mucous membranes.  Cardiac: Well perfused extremities, strong 2+ peripheral pulses. No edema.   Pulmonary: Non-labored respirations on room air without audible wheezes.   Abdomen: Soft, nontender.  Musculoskeletal: Patient ambulates with a slow, symmetric, and steady gait. No joint, bone or muscle abnormalities.  Active range of motion of the knees is 0 to 140 on the right, compared to 0 to 140 on the left.  No joint effusions bilaterally.  Right knee does demonstrate tenderness to femoral patient along the patellar tendon course and the quadriceps tendon insertion course.  There is less tenderness but mild tenderness at the tibial tubercle where there is some increased prominence on the right compared to left.  Consistent with old Osgood-Schlatter disease. Lachman 1A, anterior drawer negative, pivot shift negative bilaterally; no patellofemoral crepitus.  The right knee has very medial joint line tenderness and an equivocal medial Jj.  No lateral joint line tenderness.  No varus or valgus instability in full " extension or 30 degrees of flexion, posterior drawer or posterolateral instability bilaterally.  The neurovascular exam is intact and skin is normal.     IMAGING:   All imaging was personally reviewed by me.    Bilateral knee PA Tapia, weightbearing lateral right knee, and bilateral patellofemoral views taken today were personally reviewed by me and demonstrate no acute osseous abnormality, significant arthrosis, or evidence of fracture.  There is an old deformity of the tibial tubercle consistent with Osgood-Schlatter disease.    IMPRESSION: 31 year old-year-old male, with right knee patellar and quadriceps tendinopathy or tendinitis.     PLAN:   I recommend a course of nonoperative management for his right knee patellar tendinopathy and quadriceps tendinopathy.  This would include a period of activity modification, relative rest from running, icing, nonsteroidal anti-inflammatory drugs, and dedicated physical therapy and home exercise program with a focus on eccentric strengthening exercises.  This to be followed by a return to running program.    Physical therapy referral made for the patient to see Jeffery Pineda at [facility].    Return to clinic as needed.    At the conclusion of the office visit, Royal verbally acknowledged that I answered all of his questions satisfactorily.

## 2021-07-05 ENCOUNTER — OFFICE VISIT (OUTPATIENT)
Dept: ORTHOPEDICS | Facility: CLINIC | Age: 31
End: 2021-07-05
Payer: COMMERCIAL

## 2021-07-05 ENCOUNTER — ANCILLARY PROCEDURE (OUTPATIENT)
Dept: GENERAL RADIOLOGY | Facility: CLINIC | Age: 31
End: 2021-07-05
Attending: ORTHOPAEDIC SURGERY
Payer: COMMERCIAL

## 2021-07-05 VITALS
SYSTOLIC BLOOD PRESSURE: 112 MMHG | WEIGHT: 228.6 LBS | DIASTOLIC BLOOD PRESSURE: 76 MMHG | BODY MASS INDEX: 26.45 KG/M2 | HEIGHT: 78 IN

## 2021-07-05 DIAGNOSIS — M76.891 TENDINITIS OF RIGHT QUADRICEPS TENDON: ICD-10-CM

## 2021-07-05 DIAGNOSIS — M25.561 ACUTE PAIN OF RIGHT KNEE: ICD-10-CM

## 2021-07-05 DIAGNOSIS — M67.969 TENDINOPATHY OF PATELLA: ICD-10-CM

## 2021-07-05 DIAGNOSIS — M76.51 PATELLAR TENDINITIS OF RIGHT KNEE: Primary | ICD-10-CM

## 2021-07-05 PROCEDURE — 73562 X-RAY EXAM OF KNEE 3: CPT | Performed by: RADIOLOGY

## 2021-07-05 PROCEDURE — 99203 OFFICE O/P NEW LOW 30 MIN: CPT | Performed by: ORTHOPAEDIC SURGERY

## 2021-07-05 RX ORDER — LEVOCETIRIZINE DIHYDROCHLORIDE 5 MG/1
5 TABLET, FILM COATED ORAL EVERY EVENING
COMMUNITY

## 2021-07-05 ASSESSMENT — MIFFLIN-ST. JEOR: SCORE: 2146.05

## 2021-07-05 NOTE — LETTER
2021         RE: Royal Hernadez  [address] [facility] [address]        Dear Colleague,    Thank you for referring your patient, Royal Hernadez, to the [facility] [place]. Please see a copy of my visit note below.    CHIEF COMPLAINT: Right anterior knee pain    DIAGNOSIS: Right knee patellar tendinopathy/tendinitis and quadriceps tendinopathy/tendinitis.    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 Symptoms started in the summer of . There is not a precipitating event. He states he is a marathon runner, but over the past year has had worsening pain where he cannot run more than 2-3 miles. The pain is located to the anterior right knee, just superior and inferior to patella. Worst pain is rated a 6-7 of 10, and current pain is rated at 3 of 10. Symptoms are worsened by running, walking. Symptoms are improved with patellar tendon straps. Patient has tried patellar strap braces, rest, decreased mileage, Theragun, Naproxen, ice with little relief.  No other concerns or complaints at this time.    PAST MEDICAL HISTORY:  1. Osgood schlatter Disease     CURRENT MEDICATIONS:  1. Nasonex  2. Zyrtec  3. Levalbuterol   4. Multivitamin    ALLERGIES:   Allergies   Allergen Reactions     Methocarbamol Hives     PAST SURGICAL HISTORY:  1. Varicocele embolization    FAMILY HISTORY: No known family history of bleeding, clotting, or anesthesia related complications.    SOCIAL HISTORY: Patient lives in [place], [place] with his wife and  child. 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     TOXIC HABITS:  I spoke with Royal today regarding tobacco use and they informed me that they do not use any tobacco products..    REVIEW OF SYSTEMS:  General: Denies fevers, chills, or night sweats.  Skin: Denies rashes or lesions.  Head:  "Denies headache or dizziness.  Eyes: Denies vision changes or eye pain.  Ears: Denies ear pain or decreased hearing.  Nose: Denies nose bleeding or sinus pain.  Mouth & Throat: Denies bleeding gums or sore throat.  Neck: Denies neck pain or stiffness.  Respiratory: Denies cough, wheezing, sob, or hemoptysis.  Cardiovascular: Denies chest pain, chest pressure, or palpitations.   Gastrointestinal: Denies abdominal pain, nausea, vomiting, diarrhea, or constipation.  Genitourinary: Denies difficulty or pain with urination.   Musculoskeletal: As noted above in the HPI, otherwise normal.  Neuro: Denies paralysis, weakness, paresthesias, or speech difficulty.  Lymphatics: Denies lymphadenopathy.  Psych: Denies anxiety, sadness, or irritability.    PHYSICAL EXAM:  Patient is 6' 7\" and weighs 228 lbs 9.6 oz. /76   Ht 2.007 m (6' 7\")   Wt 103.7 kg (228 lb 9.6 oz)   BMI 25.75 kg/m    Constitutional: Well-developed, well-nourished, healthy appearing male.  Psychiatric:  Oriented to person, place and time.  Mood and affect congruent.  Skin: Warm, dry, and without rashes.  HEENT: Normocephalic, atraumatic. Extraocular movements intact. Moist mucous membranes.  Cardiac: Well perfused extremities, strong 2+ peripheral pulses. No edema.   Pulmonary: Non-labored respirations on room air without audible wheezes.   Abdomen: Soft, nontender.  Musculoskeletal: Patient ambulates with a slow, symmetric, and steady gait. No joint, bone or muscle abnormalities.  Active range of motion of the knees is 0 to 140 on the right, compared to 0 to 140 on the left.  No joint effusions bilaterally.  Right knee does demonstrate tenderness to femoral patient along the patellar tendon course and the quadriceps tendon insertion course.  There is less tenderness but mild tenderness at the tibial tubercle where there is some increased prominence on the right compared to left.  Consistent with old Osgood-Schlatter disease. Lachman 1A, anterior " drawer negative, pivot shift negative bilaterally; no patellofemoral crepitus.  The right knee has very medial joint line tenderness and an equivocal medial Jj.  No lateral joint line tenderness.  No varus or valgus instability in full extension or 30 degrees of flexion, posterior drawer or posterolateral instability bilaterally.  The neurovascular exam is intact and skin is normal.     IMAGING:   All imaging was personally reviewed by me.    Bilateral knee PA Tapia, weightbearing lateral right knee, and bilateral patellofemoral views taken today were personally reviewed by me and demonstrate no acute osseous abnormality, significant arthrosis, or evidence of fracture.  There is an old deformity of the tibial tubercle consistent with Osgood-Schlatter disease.    IMPRESSION: 31 year old-year-old male, with right knee patellar and quadriceps tendinopathy or tendinitis.     PLAN:   I recommend a course of nonoperative management for his right knee patellar tendinopathy and quadriceps tendinopathy.  This would include a period of activity modification, relative rest from running, icing, nonsteroidal anti-inflammatory drugs, and dedicated physical therapy and home exercise program with a focus on eccentric strengthening exercises.  This to be followed by a return to running program.    Physical therapy referral made for the patient to see Jeffery Pineda at [facility].    Return to clinic as needed.    At the conclusion of the office visit, Royal verbally acknowledged that I answered all of his questions satisfactorily.        Again, thank you for allowing me to participate in the care of your patient.        Sincerely,        Aneesh Cifuentes MD

## 2021-07-05 NOTE — PATIENT INSTRUCTIONS
1. Patellar tendinitis of right knee    2. Acute pain of right knee    3. Tendinopathy of patella    4. Tendinitis of right quadriceps tendon          Physical Therapy orders have been placed with [facility].  You can call [phone] to schedule at your convenience - with Jeffery ARIAS [facility]     Dr. Cifuentes recommends use of NSAIDs including: Ibuprofen, Motrin, Aleve, Advil, Naproxen for pain relief. Would recommend taking this medication with food.       Follow up with Dr. Cifuentes as needed    Call my office with any questions or concerns, [phone].

## 2021-07-05 NOTE — LETTER
July 5, 2021      Royal Hernadez  [address] [facility] [address]        To Whom It May Concern:    Royal Hernadez was seen in our clinic today for right knee pain. 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

## 2021-07-07 ENCOUNTER — THERAPY VISIT (OUTPATIENT)
Dept: PHYSICAL THERAPY | Facility: CLINIC | Age: 31
End: 2021-07-07
Attending: ORTHOPAEDIC SURGERY
Payer: COMMERCIAL

## 2021-07-07 DIAGNOSIS — M25.561 CHRONIC PAIN OF RIGHT KNEE: ICD-10-CM

## 2021-07-07 DIAGNOSIS — M76.891 TENDINITIS OF RIGHT QUADRICEPS TENDON: ICD-10-CM

## 2021-07-07 DIAGNOSIS — M76.51 PATELLAR TENDINITIS OF RIGHT KNEE: ICD-10-CM

## 2021-07-07 DIAGNOSIS — G89.29 CHRONIC PAIN OF RIGHT KNEE: ICD-10-CM

## 2021-07-07 PROCEDURE — 97110 THERAPEUTIC EXERCISES: CPT | Mod: GP | Performed by: PHYSICAL THERAPIST

## 2021-07-07 PROCEDURE — 97010 HOT OR COLD PACKS THERAPY: CPT | Mod: GP | Performed by: PHYSICAL THERAPIST

## 2021-07-07 PROCEDURE — 97161 PT EVAL LOW COMPLEX 20 MIN: CPT | Mod: GP | Performed by: PHYSICAL THERAPIST

## 2021-07-07 ASSESSMENT — ACTIVITIES OF DAILY LIVING (ADL)
KNEE_ACTIVITY_OF_DAILY_LIVING_SUM: 31
STIFFNESS: THE SYMPTOM AFFECTS MY ACTIVITY SLIGHTLY
LIMPING: THE SYMPTOM AFFECTS MY ACTIVITY SEVERELY
HOW_WOULD_YOU_RATE_THE_CURRENT_FUNCTION_OF_YOUR_KNEE_DURING_YOUR_USUAL_DAILY_ACTIVITIES_ON_A_SCALE_FROM_0_TO_100_WITH_100_BEING_YOUR_LEVEL_OF_KNEE_FUNCTION_PRIOR_TO_YOUR_INJURY_AND_0_BEING_THE_INABILITY_TO_PERFORM_ANY_OF_YOUR_USUAL_DAILY_ACTIVITIES?: 35
GO UP STAIRS: ACTIVITY IS FAIRLY DIFFICULT
PAIN: THE SYMPTOM AFFECTS MY ACTIVITY SEVERELY
GIVING WAY, BUCKLING OR SHIFTING OF KNEE: I DO NOT HAVE THE SYMPTOM
AS_A_RESULT_OF_YOUR_KNEE_INJURY,_HOW_WOULD_YOU_RATE_YOUR_CURRENT_LEVEL_OF_DAILY_ACTIVITY?: NEARLY NORMAL
RAW_SCORE: 31
RISE FROM A CHAIR: ACTIVITY IS FAIRLY DIFFICULT
GO DOWN STAIRS: ACTIVITY IS FAIRLY DIFFICULT
HOW_WOULD_YOU_RATE_THE_OVERALL_FUNCTION_OF_YOUR_KNEE_DURING_YOUR_USUAL_DAILY_ACTIVITIES?: SEVERELY ABNORMAL
KNEEL ON THE FRONT OF YOUR KNEE: ACTIVITY IS FAIRLY DIFFICULT
SWELLING: THE SYMPTOM AFFECTS MY ACTIVITY SLIGHTLY
STAND: ACTIVITY IS SOMEWHAT DIFFICULT
WEAKNESS: THE SYMPTOM AFFECTS MY ACTIVITY MODERATELY
KNEE_ACTIVITY_OF_DAILY_LIVING_SCORE: 44.29
SIT WITH YOUR KNEE BENT: ACTIVITY IS FAIRLY DIFFICULT
SQUAT: ACTIVITY IS VERY DIFFICULT
WALK: ACTIVITY IS FAIRLY DIFFICULT

## 2021-07-07 NOTE — PROGRESS NOTES
Physical Therapy Initial Evaluation  Subjective:  Pt reports right anterior knee pain that began insidiously during the summer of 2020 with running.  It has become worse over time and he now has pain with running, walking, stairs, prolonged sitting.  No swelling.  Recent x-rays and was diagnosed with patellar tendonitis.  Pt is continuing to run 2-3 miles 3-4 x/week.      The history is provided by the patient.   Patient Health History  Royal Hernadez being seen for Knee pain- Tendonitis of Quad and Patellar tendon.     Problem began: 7/15/2020.   Problem occurred: No idea?   Pain is reported as 4/10 on pain scale.  General health as reported by patient is good.       Medical allergies: none.   Surgeries include:  Other. Other surgery history details: Varicocelectomy, Varicocele Embolization, Hernia Repair.    Current medications:  Anti-inflammatory.    Current occupation is Sales.   Primary job tasks include:  Computer work, driving and prolonged sitting.                  Therapist Generated HPI Evaluation         Type of problem:  Right knee.    This is a chronic condition.  Condition occurred with:  Repetition/overuse.    Patient reports pain:  Anterior, medial, lateral and sub patellar.  Pain is described as aching and sharp and is intermittent.  Pain is the same all the time.  Since onset symptoms are gradually worsening.  Associated symptoms:  Loss of strength. Symptoms are exacerbated by bending/squatting, walking, running, descending stairs, ascending stairs and sitting  and relieved by rest.  Special tests included:  X-ray.  Past treatment: none.   Restrictions due to condition include:  Working in normal job without restrictions.  Barriers include:  None as reported by patient.                        Objective:  Standing Alignment:                Ankle/Foot:  Pes planus L and pes planus R                                                          Knee Evaluation:  ROM:  AROM: normal  PROM: normal  Strength:   Normal (Right hip abd, ER, and IR weakness)            Ligament Testing:  Normal                Special Tests:   Left knee positive for the following special tests:  Patellar Tracking-Abduction Lateral  Left knee negative for the following special tests:  Patellar compression and Pato's  Right knee positive for the following tests:  Patellar Compression and Patellar Tracking-Abduction Lateral  Right knee negative for the following special tests:  Meniscal and Pato's  Palpation:      Left knee tenderness not present at:  Patellar Tendon and Patellar Lateral  Right knee tenderness present at:  Patellar Medial and Patellar Lateral  Right knee tenderness not present at:  Patellar Tendon  Edema:  Normal    Mobility Testing:      Patellofemoral Medial:  Left: hypermobile    Right: hypermobile  Patellofemoral Lateral:  Left: hypermobile    Right: hypermobile  Patellofemoral Superior:  Left: normal    Right: normal  Patellofemoral Inferior:  Left: normal    Right: normal  Functional Testing:          Quad:    Single Leg Squat:  Left:       Moderate loss of control  Right:      Pain  Moderate loss of control  Bilateral Leg Squat:                General     ROS    Assessment/Plan:    Patient is a 31 year old male with right side knee complaints.    Patient has the following significant findings with corresponding treatment plan.                Diagnosis 1:  Right PTFC  Pain -  hot/cold therapy, splint/taping/bracing/orthotics, education and home program  Decreased strength - therapeutic exercise, therapeutic activities and home program    Therapy Evaluation Codes:   1) History comprised of:   Personal factors that impact the plan of care:      None.    Comorbidity factors that impact the plan of care are:      None.     Medications impacting care: None.  2) Examination of Body Systems comprised of:   Body structures and functions that impact the plan of care:      Knee.   Activity limitations that impact the plan of care are:       Jumping, Lifting, Running, Sports, Squatting/kneeling and Stairs.  3) Clinical presentation characteristics are:   Stable/Uncomplicated.  4) Decision-Making    Low complexity using standardized patient assessment instrument and/or measureable assessment of functional outcome.  Cumulative Therapy Evaluation is: Low complexity.    Previous and current functional limitations:  (See Goal Flow Sheet for this information)    Short term and Long term goals: (See Goal Flow Sheet for this information)     Communication ability:  Patient appears to be able to clearly communicate and understand verbal and written communication and follow directions correctly.  Treatment Explanation - The following has been discussed with the patient:   RX ordered/plan of care  Anticipated outcomes  Possible risks and side effects  This patient would benefit from PT intervention to resume normal activities.   Rehab potential is good.    Frequency:  2 X a month, once daily  Duration:  for 2 months  Discharge Plan:  Achieve all LTG.  Independent in home treatment program.  Reach maximal therapeutic benefit.    Please refer to the daily flowsheet for treatment today, total treatment time and time spent performing 1:1 timed codes.

## 2021-07-22 ENCOUNTER — THERAPY VISIT (OUTPATIENT)
Dept: PHYSICAL THERAPY | Facility: CLINIC | Age: 31
End: 2021-07-22
Payer: COMMERCIAL

## 2021-07-22 DIAGNOSIS — G89.29 CHRONIC PAIN OF RIGHT KNEE: ICD-10-CM

## 2021-07-22 DIAGNOSIS — M25.561 CHRONIC PAIN OF RIGHT KNEE: ICD-10-CM

## 2021-07-22 PROCEDURE — 97110 THERAPEUTIC EXERCISES: CPT | Mod: GP | Performed by: PHYSICAL THERAPIST

## 2021-07-22 PROCEDURE — 97010 HOT OR COLD PACKS THERAPY: CPT | Mod: GP | Performed by: PHYSICAL THERAPIST

## 2021-08-03 ENCOUNTER — TELEPHONE (OUTPATIENT)
Dept: OTHER | Facility: CLINIC | Age: 31
End: 2021-08-03

## 2021-08-03 NOTE — TELEPHONE ENCOUNTER
Patient is s/p left spermatic embolization.  Patient states, over the last month is developing the same type of left scrotal/ flank pain that he had before treatment.  Would like to move up appt.  Discussed that Dr. Ford is on vacation and unable to accommodate.  If pain worsens, instructed patient to go to the ER.    Rhiannon Hamilton RN  IR nurse clinician  [phone]

## 2021-08-06 ENCOUNTER — THERAPY VISIT (OUTPATIENT)
Dept: PHYSICAL THERAPY | Facility: CLINIC | Age: 31
End: 2021-08-06
Payer: COMMERCIAL

## 2021-08-06 DIAGNOSIS — M25.561 CHRONIC PAIN OF RIGHT KNEE: ICD-10-CM

## 2021-08-06 DIAGNOSIS — G89.29 CHRONIC PAIN OF RIGHT KNEE: ICD-10-CM

## 2021-08-06 PROCEDURE — 97010 HOT OR COLD PACKS THERAPY: CPT | Mod: GP | Performed by: PHYSICAL THERAPIST

## 2021-08-06 PROCEDURE — 97110 THERAPEUTIC EXERCISES: CPT | Mod: GP | Performed by: PHYSICAL THERAPIST

## 2021-08-06 ASSESSMENT — ACTIVITIES OF DAILY LIVING (ADL)
STIFFNESS: I DO NOT HAVE THE SYMPTOM
SIT WITH YOUR KNEE BENT: ACTIVITY IS NOT DIFFICULT
KNEE_ACTIVITY_OF_DAILY_LIVING_SUM: 64
KNEE_ACTIVITY_OF_DAILY_LIVING_SCORE: 91.43
STAND: ACTIVITY IS NOT DIFFICULT
LIMPING: I DO NOT HAVE THE SYMPTOM
HOW_WOULD_YOU_RATE_THE_OVERALL_FUNCTION_OF_YOUR_KNEE_DURING_YOUR_USUAL_DAILY_ACTIVITIES?: NEARLY NORMAL
SQUAT: ACTIVITY IS MINIMALLY DIFFICULT
HOW_WOULD_YOU_RATE_THE_CURRENT_FUNCTION_OF_YOUR_KNEE_DURING_YOUR_USUAL_DAILY_ACTIVITIES_ON_A_SCALE_FROM_0_TO_100_WITH_100_BEING_YOUR_LEVEL_OF_KNEE_FUNCTION_PRIOR_TO_YOUR_INJURY_AND_0_BEING_THE_INABILITY_TO_PERFORM_ANY_OF_YOUR_USUAL_DAILY_ACTIVITIES?: 87
GO UP STAIRS: ACTIVITY IS MINIMALLY DIFFICULT
KNEEL ON THE FRONT OF YOUR KNEE: ACTIVITY IS MINIMALLY DIFFICULT
SWELLING: I DO NOT HAVE THE SYMPTOM
RISE FROM A CHAIR: ACTIVITY IS NOT DIFFICULT
GIVING WAY, BUCKLING OR SHIFTING OF KNEE: I DO NOT HAVE THE SYMPTOM
WALK: ACTIVITY IS NOT DIFFICULT
RAW_SCORE: 64
GO DOWN STAIRS: ACTIVITY IS MINIMALLY DIFFICULT
WEAKNESS: I DO NOT HAVE THE SYMPTOM
AS_A_RESULT_OF_YOUR_KNEE_INJURY,_HOW_WOULD_YOU_RATE_YOUR_CURRENT_LEVEL_OF_DAILY_ACTIVITY?: NEARLY NORMAL
PAIN: THE SYMPTOM AFFECTS MY ACTIVITY SLIGHTLY

## 2021-08-06 NOTE — PROGRESS NOTES
Subjective:  HPI  Physical Exam                    Objective:  System    Physical Exam    General     ROS    Assessment/Plan:    DISCHARGE REPORT    Progress reporting period is from 7/7/21 to 8/6/21.       SUBJECTIVE  Subjective changes noted by patient:    Subjective: Knee is feeling better, the best it has in quite a while.  No pain with ADL's.  Less sore with prolonged driving now that he has a car with more leg room.      Current pain level is 0/10  .     Previous pain level was  4/10  .   Changes in function:  Yes (See Goal flowsheet attached for changes in current functional level)  Adverse reaction to treatment or activity: None    OBJECTIVE  Changes noted in objective findings:    Objective: No pain to palpation, PTF compression test, or with squatting.  Hip strength is strong throughout.       ASSESSMENT/PLAN  Updated problem list and treatment plan: Diagnosis 1:  Right knee PTFC    STG/LTGs have been met or progress has been made towards goals:  Yes (See Goal flow sheet completed today.)  Assessment of Progress: The patient's condition is improving.  Self Management Plans:  Patient has been instructed in a home treatment program.    Royal continues to require the following intervention to meet STG and LTG's:  PT intervention is no longer required to meet STG/LTG.    Recommendations:  This patient is ready to be discharged from therapy and continue their home treatment program.    Please refer to the daily flowsheet for treatment today, total treatment time and time spent performing 1:1 timed codes.

## 2021-08-09 ENCOUNTER — HOSPITAL ENCOUNTER (OUTPATIENT)
Dept: ULTRASOUND IMAGING | Facility: CLINIC | Age: 31
Discharge: HOME | End: 2021-08-09
Attending: RADIOLOGY | Admitting: RADIOLOGY
Payer: COMMERCIAL

## 2021-08-09 DIAGNOSIS — I86.1 VARICOCELE: ICD-10-CM

## 2021-08-09 PROCEDURE — 76870 US EXAM SCROTUM: CPT

## 2021-08-16 ENCOUNTER — VIRTUAL VISIT (OUTPATIENT)
Dept: OTHER | Facility: CLINIC | Age: 31
End: 2021-08-16
Attending: RADIOLOGY
Payer: COMMERCIAL

## 2021-08-16 DIAGNOSIS — I86.1 VARICOCELE: Primary | ICD-10-CM

## 2021-08-16 NOTE — PROGRESS NOTES
Ed is a 31 year old who is being evaluated via a billable video visit.      How would you like to obtain your AVS? [employer]  If the video visit is dropped, the invitation should be resent by: Text to cell phone: [phone]  Will anyone else be joining your video visit? No      Video Start Time:

## 2021-08-17 NOTE — PATIENT INSTRUCTIONS
Patient scheduled for repeat left gonadal venogram with possible embolization of left scrotal varicocele.  This will be done at [facility] on 8/25/2021.  We will have the nurse practitioner update his preop.  He understands he will need a Covid test within 4 days of the procedure and will also need a .

## 2021-08-18 ENCOUNTER — TELEPHONE (OUTPATIENT)
Dept: INTERVENTIONAL RADIOLOGY/VASCULAR | Facility: CLINIC | Age: 31
End: 2021-08-18

## 2021-08-18 NOTE — TELEPHONE ENCOUNTER
Pt called for pre-procedure/covid. Pt has a covid test scheduled but not ordered and was given number to call.  Pt answered all questions and had no further questions at this time.

## 2021-08-23 ENCOUNTER — LAB (OUTPATIENT)
Dept: LAB | Facility: CLINIC | Age: 31
End: 2021-08-23
Attending: RADIOLOGY
Payer: COMMERCIAL

## 2021-08-23 DIAGNOSIS — I86.1 VARICOCELE: ICD-10-CM

## 2021-08-23 LAB — SARS-COV-2 RNA RESP QL NAA+PROBE: NEGATIVE

## 2021-08-23 PROCEDURE — U0005 INFEC AGEN DETEC AMPLI PROBE: HCPCS

## 2021-08-25 ENCOUNTER — APPOINTMENT (OUTPATIENT)
Dept: INTERVENTIONAL RADIOLOGY/VASCULAR | Facility: CLINIC | Age: 31
End: 2021-08-25
Attending: RADIOLOGY
Payer: COMMERCIAL

## 2021-08-25 ENCOUNTER — HOSPITAL ENCOUNTER (OUTPATIENT)
Facility: CLINIC | Age: 31
Discharge: HOME | End: 2021-08-25
Attending: RADIOLOGY | Admitting: RADIOLOGY
Payer: COMMERCIAL

## 2021-08-25 VITALS
DIASTOLIC BLOOD PRESSURE: 88 MMHG | WEIGHT: 224.87 LBS | HEART RATE: 77 BPM | BODY MASS INDEX: 25.33 KG/M2 | OXYGEN SATURATION: 96 % | RESPIRATION RATE: 16 BRPM | TEMPERATURE: 96.6 F | SYSTOLIC BLOOD PRESSURE: 128 MMHG

## 2021-08-25 DIAGNOSIS — I86.1 VARICOCELE: ICD-10-CM

## 2021-08-25 DIAGNOSIS — I86.1 LEFT VARICOCELE: Primary | ICD-10-CM

## 2021-08-25 DIAGNOSIS — R52 PAIN: ICD-10-CM

## 2021-08-25 LAB
ANION GAP SERPL CALCULATED.3IONS-SCNC: 3 MMOL/L (ref 3–14)
APTT PPP: 29 SECONDS (ref 22–38)
BUN SERPL-MCNC: 10 MG/DL (ref 7–30)
CALCIUM SERPL-MCNC: 9.4 MG/DL (ref 8.5–10.1)
CHLORIDE BLD-SCNC: 103 MMOL/L (ref 94–109)
CO2 SERPL-SCNC: 30 MMOL/L (ref 20–32)
CREAT SERPL-MCNC: 0.97 MG/DL (ref 0.66–1.25)
ERYTHROCYTE [DISTWIDTH] IN BLOOD BY AUTOMATED COUNT: 12.5 % (ref 10–15)
GFR SERPL CREATININE-BSD FRML MDRD: >90 ML/MIN/1.73M2
GLUCOSE BLD-MCNC: 94 MG/DL (ref 70–99)
HCT VFR BLD AUTO: 46.4 % (ref 40–53)
HGB BLD-MCNC: 15.7 G/DL (ref 13.3–17.7)
INR PPP: 1.09 (ref 0.85–1.15)
MCH RBC QN AUTO: 30.6 PG (ref 26.5–33)
MCHC RBC AUTO-ENTMCNC: 33.8 G/DL (ref 31.5–36.5)
MCV RBC AUTO: 90 FL (ref 78–100)
PLATELET # BLD AUTO: 253 10E3/UL (ref 150–450)
POTASSIUM BLD-SCNC: 3.9 MMOL/L (ref 3.4–5.3)
RBC # BLD AUTO: 5.13 10E6/UL (ref 4.4–5.9)
SODIUM SERPL-SCNC: 136 MMOL/L (ref 133–144)
WBC # BLD AUTO: 6.1 10E3/UL (ref 4–11)

## 2021-08-25 PROCEDURE — C1769 GUIDE WIRE: HCPCS

## 2021-08-25 PROCEDURE — 76937 US GUIDE VASCULAR ACCESS: CPT

## 2021-08-25 PROCEDURE — 85027 COMPLETE CBC AUTOMATED: CPT | Performed by: RADIOLOGY

## 2021-08-25 PROCEDURE — 36012 PLACE CATHETER IN VEIN: CPT

## 2021-08-25 PROCEDURE — 255N000002 HC RX 255 OP 636: Performed by: RADIOLOGY

## 2021-08-25 PROCEDURE — 75831 VEIN X-RAY KIDNEY: CPT

## 2021-08-25 PROCEDURE — 272N000566 HC SHEATH CR3

## 2021-08-25 PROCEDURE — 272N000409 HC GLUE EMBOLI CR28

## 2021-08-25 PROCEDURE — 250N000009 HC RX 250: Performed by: NURSE PRACTITIONER

## 2021-08-25 PROCEDURE — 85730 THROMBOPLASTIN TIME PARTIAL: CPT | Mod: GZ | Performed by: RADIOLOGY

## 2021-08-25 PROCEDURE — 82374 ASSAY BLOOD CARBON DIOXIDE: CPT | Performed by: RADIOLOGY

## 2021-08-25 PROCEDURE — 272N000127 HC CATH CR16

## 2021-08-25 PROCEDURE — 272N000125 HC CATH CR12

## 2021-08-25 PROCEDURE — 250N000011 HC RX IP 250 OP 636: Performed by: NURSE PRACTITIONER

## 2021-08-25 PROCEDURE — 250N000013 HC RX MED GY IP 250 OP 250 PS 637: Performed by: NURSE PRACTITIONER

## 2021-08-25 PROCEDURE — 85610 PROTHROMBIN TIME: CPT | Performed by: RADIOLOGY

## 2021-08-25 PROCEDURE — 250N000011 HC RX IP 250 OP 636: Performed by: RADIOLOGY

## 2021-08-25 PROCEDURE — 999N000012 HC STATISTIC ANGIOGRAM, STENT, VERTEBRO PLASTY

## 2021-08-25 PROCEDURE — 258N000003 HC RX IP 258 OP 636: Performed by: RADIOLOGY

## 2021-08-25 PROCEDURE — 272N000196 HC ACCESSORY CR5

## 2021-08-25 PROCEDURE — 75736 ARTERY X-RAYS PELVIS: CPT

## 2021-08-25 PROCEDURE — 272N000116 HC CATH CR1

## 2021-08-25 PROCEDURE — 272N000192 HC ACCESSORY CR2

## 2021-08-25 PROCEDURE — 36415 COLL VENOUS BLD VENIPUNCTURE: CPT | Performed by: RADIOLOGY

## 2021-08-25 RX ORDER — IOPAMIDOL 612 MG/ML
100 INJECTION, SOLUTION INTRAVASCULAR ONCE
Status: COMPLETED | OUTPATIENT
Start: 2021-08-25 | End: 2021-08-25

## 2021-08-25 RX ORDER — HEPARIN SODIUM 200 [USP'U]/100ML
3 INJECTION, SOLUTION INTRAVENOUS CONTINUOUS PRN
Status: DISCONTINUED | OUTPATIENT
Start: 2021-08-25 | End: 2021-08-25 | Stop reason: HOSPADM

## 2021-08-25 RX ORDER — NITROGLYCERIN 5 MG/ML
100-500 VIAL (ML) INTRAVENOUS EVERY 5 MIN PRN
Status: DISCONTINUED | OUTPATIENT
Start: 2021-08-25 | End: 2021-08-25 | Stop reason: HOSPADM

## 2021-08-25 RX ORDER — ACETAMINOPHEN 325 MG/1
975 TABLET ORAL
Status: DISCONTINUED | OUTPATIENT
Start: 2021-08-25 | End: 2021-08-25 | Stop reason: HOSPADM

## 2021-08-25 RX ORDER — SODIUM TETRADECYL SULFATE 30 MG/ML
5 INJECTION, SOLUTION INTRAVENOUS ONCE
Status: DISCONTINUED | OUTPATIENT
Start: 2021-08-25 | End: 2021-08-25 | Stop reason: HOSPADM

## 2021-08-25 RX ORDER — SODIUM CHLORIDE 9 MG/ML
INJECTION, SOLUTION INTRAVENOUS CONTINUOUS
Status: DISCONTINUED | OUTPATIENT
Start: 2021-08-25 | End: 2021-08-25 | Stop reason: HOSPADM

## 2021-08-25 RX ORDER — HEPARIN SODIUM 200 [USP'U]/100ML
1 INJECTION, SOLUTION INTRAVENOUS CONTINUOUS PRN
Status: DISCONTINUED | OUTPATIENT
Start: 2021-08-25 | End: 2021-08-25 | Stop reason: HOSPADM

## 2021-08-25 RX ORDER — NALOXONE HYDROCHLORIDE 0.4 MG/ML
0.2 INJECTION, SOLUTION INTRAMUSCULAR; INTRAVENOUS; SUBCUTANEOUS
Status: DISCONTINUED | OUTPATIENT
Start: 2021-08-25 | End: 2021-08-25 | Stop reason: HOSPADM

## 2021-08-25 RX ORDER — FLUMAZENIL 0.1 MG/ML
0.2 INJECTION, SOLUTION INTRAVENOUS
Status: DISCONTINUED | OUTPATIENT
Start: 2021-08-25 | End: 2021-08-25 | Stop reason: HOSPADM

## 2021-08-25 RX ORDER — LIDOCAINE 40 MG/G
CREAM TOPICAL
Status: DISCONTINUED | OUTPATIENT
Start: 2021-08-25 | End: 2021-08-25 | Stop reason: HOSPADM

## 2021-08-25 RX ORDER — NALOXONE HYDROCHLORIDE 0.4 MG/ML
0.4 INJECTION, SOLUTION INTRAMUSCULAR; INTRAVENOUS; SUBCUTANEOUS
Status: DISCONTINUED | OUTPATIENT
Start: 2021-08-25 | End: 2021-08-25 | Stop reason: HOSPADM

## 2021-08-25 RX ORDER — OXYCODONE AND ACETAMINOPHEN 5; 325 MG/1; MG/1
1 TABLET ORAL EVERY 6 HOURS PRN
Qty: 18 TABLET | Refills: 0 | Status: SHIPPED | OUTPATIENT
Start: 2021-08-25 | End: 2021-08-30

## 2021-08-25 RX ORDER — FENTANYL CITRATE 50 UG/ML
25-50 INJECTION, SOLUTION INTRAMUSCULAR; INTRAVENOUS EVERY 5 MIN PRN
Status: DISCONTINUED | OUTPATIENT
Start: 2021-08-25 | End: 2021-08-25 | Stop reason: HOSPADM

## 2021-08-25 RX ADMIN — ACETAMINOPHEN 975 MG: 325 TABLET, FILM COATED ORAL at 12:14

## 2021-08-25 RX ADMIN — HEPARIN SODIUM 3 BAG: 200 INJECTION, SOLUTION INTRAVENOUS at 13:43

## 2021-08-25 RX ADMIN — NITROGLYCERIN 400 MCG: 10 INJECTION INTRAVENOUS at 14:08

## 2021-08-25 RX ADMIN — SODIUM CHLORIDE: 9 INJECTION, SOLUTION INTRAVENOUS at 11:27

## 2021-08-25 RX ADMIN — IOPAMIDOL 72 ML: 612 INJECTION, SOLUTION INTRAVENOUS at 15:31

## 2021-08-25 RX ADMIN — FENTANYL CITRATE 50 MCG: 50 INJECTION, SOLUTION INTRAMUSCULAR; INTRAVENOUS at 13:47

## 2021-08-25 RX ADMIN — MIDAZOLAM HYDROCHLORIDE 1 MG: 1 INJECTION, SOLUTION INTRAMUSCULAR; INTRAVENOUS at 13:40

## 2021-08-25 RX ADMIN — NITROGLYCERIN 200 MCG: 10 INJECTION INTRAVENOUS at 14:31

## 2021-08-25 RX ADMIN — FENTANYL CITRATE 50 MCG: 50 INJECTION, SOLUTION INTRAMUSCULAR; INTRAVENOUS at 14:34

## 2021-08-25 RX ADMIN — MIDAZOLAM HYDROCHLORIDE 1 MG: 1 INJECTION, SOLUTION INTRAMUSCULAR; INTRAVENOUS at 14:54

## 2021-08-25 RX ADMIN — FENTANYL CITRATE 50 MCG: 50 INJECTION, SOLUTION INTRAMUSCULAR; INTRAVENOUS at 13:54

## 2021-08-25 RX ADMIN — FENTANYL CITRATE 50 MCG: 50 INJECTION, SOLUTION INTRAMUSCULAR; INTRAVENOUS at 14:52

## 2021-08-25 RX ADMIN — FENTANYL CITRATE 100 MCG: 50 INJECTION, SOLUTION INTRAMUSCULAR; INTRAVENOUS at 15:08

## 2021-08-25 RX ADMIN — FENTANYL CITRATE 50 MCG: 50 INJECTION, SOLUTION INTRAMUSCULAR; INTRAVENOUS at 14:54

## 2021-08-25 RX ADMIN — FENTANYL CITRATE 50 MCG: 50 INJECTION, SOLUTION INTRAMUSCULAR; INTRAVENOUS at 13:41

## 2021-08-25 RX ADMIN — MIDAZOLAM HYDROCHLORIDE 1 MG: 1 INJECTION, SOLUTION INTRAMUSCULAR; INTRAVENOUS at 13:47

## 2021-08-25 RX ADMIN — FENTANYL CITRATE 50 MCG: 50 INJECTION, SOLUTION INTRAMUSCULAR; INTRAVENOUS at 14:00

## 2021-08-25 RX ADMIN — MIDAZOLAM HYDROCHLORIDE 1 MG: 1 INJECTION, SOLUTION INTRAMUSCULAR; INTRAVENOUS at 14:35

## 2021-08-25 RX ADMIN — LIDOCAINE HYDROCHLORIDE 8 ML: 10 INJECTION, SOLUTION INFILTRATION; PERINEURAL at 13:56

## 2021-08-25 RX ADMIN — FENTANYL CITRATE 50 MCG: 50 INJECTION, SOLUTION INTRAMUSCULAR; INTRAVENOUS at 14:28

## 2021-08-25 RX ADMIN — MIDAZOLAM HYDROCHLORIDE 2 MG: 1 INJECTION, SOLUTION INTRAMUSCULAR; INTRAVENOUS at 13:54

## 2021-08-25 NOTE — PROGRESS NOTES
Care Suites Post Procedure Note    Patient Information  Name: Royal Hernadez  Age: 31 year old    Post Procedure  Time patient returned to Care Suites: 1535  Concerns/abnormal assessment: no immediate  If abnormal assessment, provider notified: N/A  Plan/Other: Continue post procedure plan of care.  RIJ site: gauze and tegaderm CDI, no hematoma.  Pulse is palpable.  VS stable, denies any pain or discomfort.  Taking po fluids well.   Anticipate 2 hours bedrest until 1735  Anticipate discharge later today if stable.    Detailed report given to Michelle to continue monitoring pt prior to discharge.      Shayy Ramírez RN

## 2021-08-25 NOTE — PROGRESS NOTES
Care Suites Admission Nursing Note    Patient Information  Name: Royal Hernadez  Age: 31 year old  Reason for admission: Spermatic embolization  Care Suites arrival time: 1100    Visitor Information  Name: NA       Patient Admission/Assessment   Pre-procedure assessment complete: Yes  If abnormal assessment/labs, provider notified: N/A  NPO: Yes  Medications held per instructions/orders: Yes  Consent: to be  obtained  Patient oriented to room: Yes  Education/questions answered: Yes  Plan/other: Proceed as planned.  Pt c/o some mild neck discomfort.  Tylenol 975 mg given as ordered.  Continue to evaluate.    Discharge Planning  Discharge name/phone number: Kristie [phone]   Overnight post sedation caregiver: yes  Discharge location: home    Shayy Ramírez RN

## 2021-08-25 NOTE — PROGRESS NOTES
PATIENT/VISITOR WELLNESS SCREENING    Step 1 Patient Screening    1. In the last month, have you been in contact with someone who was confirmed or suspected to have Coronavirus/COVID-19? No    2. Do you have the following symptoms?  Fever/Chills? No   Cough? No   Shortness of breath? No   New loss of taste or smell? No  Sore throat? No  Muscle or body aches? No  Headaches? No  Fatigue? No  Vomiting or diarrhea? No    Step 2 Visitor Screening    1. Name of Visitor (1 visitor per patient): NA    2. In the last month, have you been in contact with someone who was confirmed or suspected to have Coronavirus/COVID-19? No    3. Do you have the following symptoms?  Fever/Chills? No   Cough? No   Shortness of breath? No   Skin rash? No   Loss of taste or smell? No  Sore throat? No  Runny or stuffy nose? No  Muscle or body aches? No  Headaches? No  Fatigue? No  Vomiting or diarrhea? No    If the visitor has positive symptoms, notify supervisor/manger  Per policy, the visitor will need to leave the facility     Step 3 Refer to logic grid below for actions    NO SYMPTOM(S)    ACTIONS:  1. Standard rooming process  2. Provider to assess per normal protocol  3. Implement precautions as needed and per guidelines     POSITIVE SYMPTOM(S)  If positive for ANY of the following symptoms: fever, cough, shortness of breath, rash    ACTION:  1. Continue to have the patient wear a mask   2. Room patient as soon as possible  3. Don appropriate PPE when entering room  4. Provider evaluation

## 2021-08-25 NOTE — DISCHARGE INSTRUCTIONS
Spermatic Vein Embolization Discharge Instructions  After you go home:      Have an adult stay with you until tomorrow.    Drink extra fluids for 2 days.    You may resume your normal diet.    No smoking       For 24 hours - due to the sedation you received:    Relax and take it easy.    Do NOT make any important or legal decisions.    Do NOT drive or operate machines at home or at work.    Do NOT drink alcohol.    Care of Neck Puncture Site:      For the first 24 hrs - check the puncture site every 1-2 hours while awake.    Remove the bandaid after 24 hours. If there is minor oozing, apply another bandaid and remove it after 12 hours.    It is normal to have a small bruise or soreness at the site.    You may shower tomorrow.  Do NOT take a bath, or use a hot tub or pool for at least 3 days. Do NOT scrub the site. Do not use lotion or powder near the puncture site.    Activity:            For 2 days:    For the next 7 days - Do not have sex    Do NOT do any heavy activity such as exercise, lifting, or straining.     No housework, yard work or any activity that make you sweat    Do NOT lift more than 10 pounds    Avoid heavy lifting or the overuse of your shoulder for three days.           Bleeding:      If you start bleeding from the site in your neck, sit down and press gently on the site for 10 minutes.     Once bleeding stops, sit still for 2 hours.     [facility] as soon as you can.       Call 911 right away if you have heavy bleeding or bleeding that does not stop.      Medicines:      If you have stopped any medicines, check with your provider about when to restart them.    Follow Up Appointments:      Follow up with [facility] as directed.    The clinic will call you in one month to set up an ultrasound.   Call the clinic if:      You have pain or trouble passing urine (urinating)    You have increased pain or a large or growing hard lump around the site.    The site is red,  swollen, hot or tender.    Blood or fluid is draining from the site.    You have chills or a fever greater than 101 F (38 C).    You have hives, a rash or unusual itching.    Any questions or concerns.              Other Instructions:      It is normal to have swelling, pain or tingling in the scrotum. This may last up      to a week.    The symptoms you had before treatment may take up to 4 weeks to go away.      If you have questions or your original symptoms do not improve, call:         [facility] @ [phone]

## 2021-08-25 NOTE — IR NOTE
Interventional Radiology Intra-procedural Nursing Note     Patient Name:  Royal Hernadez    Medical Record Number: [id number]  Today's Date:  August 25, 2021  Procedure: Spermatic Vein Embolization  Start Time: 1352  End of procedure time: 1518    Report given to: MARTIN Sanchez RN  Time pt departs:  1532       Notes:      Patient brought into IR room # 1 at 1330.      Informed consent obtained by Dr. Logan MD.         Allergies   Allergen Reactions     Methocarbamol Hives       Labs reviewed:  Results for orders placed or performed during the hospital encounter of 08/25/21 (from the past 24 hour(s))   Basic metabolic panel   Result Value Ref Range    Sodium 136 133 - 144 mmol/L    Potassium 3.9 3.4 - 5.3 mmol/L    Chloride 103 94 - 109 mmol/L    Carbon Dioxide (CO2) 30 20 - 32 mmol/L    Anion Gap 3 3 - 14 mmol/L    Urea Nitrogen 10 7 - 30 mg/dL    Creatinine 0.97 0.66 - 1.25 mg/dL    Calcium 9.4 8.5 - 10.1 mg/dL    Glucose 94 70 - 99 mg/dL    GFR Estimate >90 >60 mL/min/1.73m2   CBC with platelets   Result Value Ref Range    WBC Count 6.1 4.0 - 11.0 10e3/uL    RBC Count 5.13 4.40 - 5.90 10e6/uL    Hemoglobin 15.7 13.3 - 17.7 g/dL    Hematocrit 46.4 40.0 - 53.0 %    MCV 90 78 - 100 fL    MCH 30.6 26.5 - 33.0 pg    MCHC 33.8 31.5 - 36.5 g/dL    RDW 12.5 10.0 - 15.0 %    Platelet Count 253 150 - 450 10e3/uL   INR   Result Value Ref Range    INR 1.09 0.85 - 1.15   Partial thromboplastin time   Result Value Ref Range    aPTT 29 22 - 38 Seconds     Peripheral pulses checked and documented in Epic Flowsheet.     Patient prepped with 2% Chlorhexidine and draped in supine position.  VSS.  Monitor reads NSR with rate in the 70's.      MD first needle stick time was 1356. A total of 8 ml of 1% lidocaine was used locally at the puncture site.    A 6Fr venous sheath was placed at     Debrief was completed by Dr. Logan MD.       Patient received a total of 6 mg Versed and 500 mcg fentanyl for sedation during the procedure. The  last dose was given at 1508.      The patient tolerated the procedure well.     A 6Fr venous sheath was removed at 1515 and pressure was held by Joleen.  The site is C/D/I at procedure end without hematoma.       Jason Guajardo RN

## 2021-08-25 NOTE — PRE-PROCEDURE
GENERAL PRE-PROCEDURE:   Procedure:  Left gonadal and left iliac venogram with possible embolization, possible direct stick venogram in varicocele, with intravenous moderate sedation   Date/Time:  8/25/2021 11:50 AM    Written consent obtained?: Yes    Risks and benefits: Risks, benefits and alternatives were discussed    Consent given by:  Patient  Patient states understanding of procedure being performed: Yes    Patient's understanding of procedure matches consent: Yes    Procedure consent matches procedure scheduled: Yes    Expected level of sedation:  Moderate  Appropriately NPO:  Yes  ASA Class:  2  Mallampati  :  Grade 2- soft palate, base of uvula, tonsillar pillars, and portion of posterior pharyngeal wall visible  Lungs:  Lungs clear with good breath sounds bilaterally  Heart:  Normal heart sounds and rate  History & Physical reviewed:  History and physical reviewed and no updates needed  Statement of review:  I have reviewed the lab findings, diagnostic data, medications, and the plan for sedation    Abbreviated H and P for above procedure with Sedation    Reason for Procedure: Scrotal varicocele, pain    PMH:Left flank and scrotal pain and pressure recurred post spermatic vein embolization in July 2021. Here for another venogram.   (ROS details in pre procedure assessment)  ROS: 10 point ROS neg other than the symptoms noted above in the HPI.  History of sleep apnea? No  History of problems with sedation? No  History of blood thinners? No   NPO? Yes  Current smoker? No  Negative for recent fever, cough, chest or sinus congestion, SOB, weight loss, chest pain, diarrhea or constipation.     Focused Physical exam pertinent to procedure:          (Details of heart, lung and mallampati assessment in pre procedure assessment)  General- Alert, oriented, well cared for, pleasant male in NAD.   Recent vital signs Temp:  [96.6  F (35.9  C)] 96.6  F (35.9  C)  Pulse:  [76] 76  Resp:  [18] 18  BP: (137)/(74)  137/74  SpO2:  [99 %] 99 %    A/P:Reviewed history, medications, allergies, clinical information and pre procedure assessment with MD.  Patient consent obtained by me and Dr Ford. Jorge Hernadez is approved for Moderate sedation for the venogram procedure and will proceed when called for.      Thanks [facility] Interventional Radiology CNP ([phone]) (phone [phone])

## 2021-08-26 ENCOUNTER — TELEPHONE (OUTPATIENT)
Dept: OTHER | Facility: CLINIC | Age: 31
End: 2021-08-26

## 2021-08-26 DIAGNOSIS — I86.1 VARICOCELE: Primary | ICD-10-CM

## 2021-08-26 PROCEDURE — 36247 INS CATH ABD/L-EXT ART 3RD: CPT

## 2021-08-26 NOTE — TELEPHONE ENCOUNTER
Patient is doing well.  Just sore in the neck. Puncture site without bleeding or concerns.  Reviewed procedure with the patient.  Instructed to call if concerns.  Will follow up phone call in 1 month.  3 mo imaging and consult with Dr. Ford.  Rhiannon Hamilton RN  IR nurse clinician  [phone]

## 2021-08-26 NOTE — TELEPHONE ENCOUNTER
Follow up s/p left spermatic embolization.  Left message on VM.  Rhiannon Hamilton RN  IR nurse clinician  [phone]

## 2021-10-02 ENCOUNTER — HEALTH MAINTENANCE LETTER (OUTPATIENT)
Age: 31
End: 2021-10-02

## 2021-11-29 ENCOUNTER — HOSPITAL ENCOUNTER (OUTPATIENT)
Dept: ULTRASOUND IMAGING | Facility: CLINIC | Age: 31
End: 2021-11-29
Attending: RADIOLOGY
Payer: COMMERCIAL

## 2021-11-29 ENCOUNTER — OFFICE VISIT (OUTPATIENT)
Dept: OTHER | Facility: CLINIC | Age: 31
End: 2021-11-29
Attending: RADIOLOGY
Payer: COMMERCIAL

## 2021-11-29 VITALS — HEART RATE: 85 BPM | SYSTOLIC BLOOD PRESSURE: 124 MMHG | DIASTOLIC BLOOD PRESSURE: 81 MMHG

## 2021-11-29 DIAGNOSIS — I86.1 VARICOCELE: Primary | ICD-10-CM

## 2021-11-29 DIAGNOSIS — I86.1 VARICOCELE: ICD-10-CM

## 2021-11-29 PROCEDURE — G0463 HOSPITAL OUTPT CLINIC VISIT: HCPCS

## 2021-11-29 PROCEDURE — 76870 US EXAM SCROTUM: CPT

## 2021-11-29 NOTE — PROGRESS NOTES
[facility]        Patient is here for a  follow up.     Pt is currently taking no meds that would impact our treatment plan.    /81 (BP Location: Left arm, Patient Position: Chair, Cuff Size: Adult Regular)   Pulse 85     The provider has been notified that the patient has no concerns.     Questions patient would like addressed today are: N/A.    Refills are needed: N/A    Has homecare services and agency name:  Teressa Ferguson MA

## 2021-11-30 NOTE — PATIENT INSTRUCTIONS
Plan: At this point I do not have any interventional options for treatment of the varicocele.  I recommended that he return to his urologist for further options.  However we will continue to monitor the patient and his symptoms.  If his symptoms would worsen then we could repeat the imaging and see if the collateral vein has increased in size making it possible to cannulize and embolize.  The patient agrees to the plan.

## 2021-11-30 NOTE — PROGRESS NOTES
VASCULAR OUTPATIENT CONSULT OR VISIT  PHYSICIAN:  Dr. Bear Ford      LOCATION: [facility]    Royal Hernadez   Medical Record #:  [id number]  YOB: 1990  Age:  31 year old     Date of Service: 11/29/2021    PRIMARY CARE PROVIDER: Teressa Ref-Primary, Physician    HPI:  Royal Hernadez is a 31 year old male who underwent a left spermatic embolization on 5/7/2021 for symptomatic left varicocele.  The patient has a history of left varicocele with surgical treatment when he was 16 years old.  The patient initially got relief for approximately 1 month of his symptoms.  Unfortunately his symptoms reoccurred and the pain was affecting his lifestyle.  We then elected to undergo a repeat left spermatic embolization on 8/25/2021.  Today, the patient states that although improved he still has mild to moderate left scrotal aching with pain into the inguinal area.  It is not as severe as previous treatment however it is noticeable.  It does worsen with exercise.  He also states that he visually can see the veins on his scrotum.    PHH:    Past Medical History:   Diagnosis Date     Anxiety      Gastro-oesophageal reflux disease         Past Surgical History:   Procedure Laterality Date     HERNIA REPAIR      at age 15     IR SPERMATIC VEIN EMBOLIZATION  5/7/2021     IR SPERMATIC VEIN EMBOLIZATION  8/25/2021       ALLERGIES:  Methocarbamol    MEDS:    Current Outpatient Medications:      levalbuterol (XOPENEX HFA) 45 MCG/ACT inhaler, levalbuterol HFA 45 mcg/actuation aerosol inhaler, Disp: , Rfl:      levocetirizine (XYZAL) 5 MG tablet, Take 5 mg by mouth every evening, Disp: , Rfl:      mometasone (NASONEX) 50 MCG/ACT nasal spray, Spray 2 sprays in nostril, Disp: , Rfl:      Multiple Vitamins-Minerals (MULTIVITAMIN GUMMIES MENS) CHEW, , Disp: , Rfl:      Omega-3 Fatty Acids (FISH OIL) 500 MG CAPS, Take 1,000 mg by mouth, Disp: , Rfl:      Vitamin D, Cholecalciferol, 25 MCG (1000 UT) CAPS, Take  2,000 Units by mouth, Disp: , Rfl:      cetirizine (ZYRTEC) 10 MG tablet, Take 10 mg by mouth daily (Patient not taking: Reported on 11/29/2021), Disp: , Rfl:     SOCIAL HABITS:    History   Smoking Status     Never Smoker   Smokeless Tobacco     Never Used     Social History    Substance and Sexual Activity      Alcohol use: Yes      History   Drug Use Unknown     Comment: ocass use       FAMILY HISTORY:  No family history on file.    REVIEW OF SYSTEMS:    A 12 point ROS was reviewed and except for what is listed in the HPI above, all others are negative    PE:  /81 (BP Location: Left arm, Patient Position: Chair, Cuff Size: Adult Regular)   Pulse 85   Wt Readings from Last 1 Encounters:   08/25/21 224 lb 13.9 oz (102 kg)     There is no height or weight on file to calculate BMI.    EXAM:  GENERAL: This is a well-developed 31 year old male who appears his stated age  EYES: Grossly normal.  MOUTH: Buccal mucosa normal   MUSCULOSKELETAL: Grossly normal and both lower extremities are intact.  HEME/LYMPH: No lymphedema  NEUROLOGIC: Focally intact, Alert and oriented x 3.   PSYCH: appropriate affect  INTEGUMENT: No open lesions or ulcers        DIAGNOSTIC STUDIES:     Images:  US Testicular & Scrotum w Doppler Ltd    Result Date: 11/30/2021  ULTRASOUND TESTICULAR AND SCROTUM WITH DOPPLER LIMITED November 29, 2021 12:54 PM HISTORY: Follow-up after embolization of left varicocele, repeat treatment done on 8/25/2021. COMPARISON: 8/9/2021. FINDINGS: Right: Normal homogeneous testicular echotexture, without focal mass. Right testicle measures 5.1 x 3.2 x 2.5 cm. Doppler imaging with color waveform analysis demonstrates normal arterial waveforms within the testicle. No evidence for testicular torsion. Epididymis is normal. No hydroceles or varicoceles are evident. Left: Normal homogeneous testicular echotexture, without focal mass. Left testicle measures 5.3 x 3.5 x 2.4 cm. Doppler imaging with color waveform analysis  demonstrates normal arterial waveforms within the testicle. No evidence for testicular torsion. Epididymis is normal. No hydrocele. Small left varicocele has a similar appearance to the previous exam.      IMPRESSION: Small left hydrocele has a similar appearance when compared to 8/9/2021.    LABS:      Sodium   Date Value Ref Range Status   08/25/2021 136 133 - 144 mmol/L Final     Urea Nitrogen   Date Value Ref Range Status   08/25/2021 10 7 - 30 mg/dL Final     Hemoglobin   Date Value Ref Range Status   08/25/2021 15.7 13.3 - 17.7 g/dL Final   05/07/2021 15.1 13.3 - 17.7 g/dL Final     Platelet Count   Date Value Ref Range Status   08/25/2021 253 150 - 450 10e3/uL Final   05/07/2021 229 150 - 450 10e9/L Final     INR   Date Value Ref Range Status   08/25/2021 1.09 0.85 - 1.15 Final     Comment:     Effective 7/11/2021, the reference range for this assay has changed.   05/07/2021 1.05 0.86 - 1.14 Final     Assessment/plan: This is a pleasant 31-year-old male that underwent a repeat left spermatic embolization on 8/25/2021 for recurrent left scrotal pain.  The patient states that all improved, he still has aching and pain following exercise.  We discussed the results of his ultrasound that was performed today.  There continues to be a small left hydrocele that has a similar appearance when compared to 8/9/2021.  I also reviewed the venogram that was performed on 8/25/2021.  There is a small collateral vein that is likely supplying blood to the varicocele however I did discuss with the patient that I did attempt to cannulized this vein, unfortunately due to the small diameter of the vein it was unsuccessful.  Plan: At this point I do not have any interventional options for treatment of the varicocele.  I recommended that he return to his urologist for further options.  However we will continue to monitor the patient and his symptoms.  If his symptoms would worsen then we could repeat the imaging and see if the  collateral vein has increased in size making it possible to cannulize and embolize.  The patient agrees to the plan.      This was a in person visit in which 30 minutes of  total time was spent (either in face-to-face or non-face-to-face time).    Dr. Bear Ford   Interventional Radiology  Pager# [phone]  [facility], [place]

## 2021-11-30 NOTE — RESULT ENCOUNTER NOTE
Discussed results during 11/29/2021 consult with Dr. Ford.  Rhiannon Hamilton RN  IR nurse clinician  [phone]

## 2022-05-14 ENCOUNTER — HEALTH MAINTENANCE LETTER (OUTPATIENT)
Age: 32
End: 2022-05-14

## 2022-09-03 ENCOUNTER — HEALTH MAINTENANCE LETTER (OUTPATIENT)
Age: 32
End: 2022-09-03

## 2023-06-03 ENCOUNTER — HEALTH MAINTENANCE LETTER (OUTPATIENT)
Age: 33
End: 2023-06-03

## 2023-11-07 NOTE — PROGRESS NOTES
VASCULAR OUTPATIENT CONSULT OR VISIT  PHYSICIAN: Dr. Ford      LOCATION: [facility]  This visit is being conducted as a virtual visit due to the emphasis on mitigation of the COVID 19 virus pandemic.  The clinician has decided that the risk of an in office visit outweighs the benefit for this patient.       Royal Hernadez   Medical Record #:  [id number]  YOB: 1990  Age:  31 year old     Date of Service: 8/16/2021    HPI:  Royal Hernadez is a 31 year old male who left spermatic embolization on 5/7/2021 for symptomatic left varicocele.  The patient has a history of left varicocele with treat surgical treatment when he was 16 years old.  The patient initially got relief for approximately 1 month of his symptoms.  Unfortunately over the last month he has noticed significant aching and pain in the left scrotum that is worse than preprocedure. The pain is now continuous and affecting his everyday life.   PHH:    Past Medical History:   Diagnosis Date     Anxiety      Gastro-oesophageal reflux disease         Past Surgical History:   Procedure Laterality Date     HERNIA REPAIR      at age 15     IR SPERMATIC VEIN EMBOLIZATION  5/7/2021       ALLERGIES:  Methocarbamol    MEDS:    Current Outpatient Medications:      cetirizine (ZYRTEC) 10 MG tablet, Take 10 mg by mouth daily, Disp: , Rfl:      levalbuterol (XOPENEX HFA) 45 MCG/ACT inhaler, levalbuterol HFA 45 mcg/actuation aerosol inhaler, Disp: , Rfl:      levocetirizine (XYZAL) 5 MG tablet, Take 5 mg by mouth every evening, Disp: , Rfl:      mometasone (NASONEX) 50 MCG/ACT nasal spray, Spray 2 sprays in nostril, Disp: , Rfl:      Multiple Vitamins-Minerals (MULTIVITAMIN GUMMIES MENS) CHEW, , Disp: , Rfl:      Vitamin D, Cholecalciferol, 25 MCG (1000 UT) CAPS, Take 2,000 Units by mouth, Disp: , Rfl:      Omega-3 Fatty Acids (FISH OIL) 500 MG CAPS, Take 1,000 mg by mouth, Disp: , Rfl:     SOCIAL HABITS:    History   Smoking Status      Never Smoker   Smokeless Tobacco     Never Used     Social History    Substance and Sexual Activity      Alcohol use: Yes      History   Drug Use Unknown     Comment: ocass use       FAMILY HISTORY:  No family history on file.    PE:  There were no vitals taken for this visit.  Wt Readings from Last 1 Encounters:   07/05/21 228 lb 9.6 oz (103.7 kg)     There is no height or weight on file to calculate BMI.    DIAGNOSTIC STUDIES:     Images:  US Testicular & Scrotum w Doppler Ltd    Result Date: 8/9/2021  US TESTICULAR AND SCROTUM WITH DOPPLER LIMITED 8/9/2021 1:11 PM CLINICAL HISTORY: s/p left spermatic embolization done 5/7 with Dr. Ford   3 mo f/u; Varicocele TECHNIQUE: Ultrasound of scrotum with color flow and spectral Doppler with waveform analysis performed. COMPARISON: 4/15/2021 FINDINGS: RIGHT: Right testicle measures 5.5 x 3.3 x 2.6 cm. Normal testicle with no masses. Normal arterial duplex and normal color flow. Normal epididymis. No hydrocele. No varicocele. LEFT: Left testicle measures 5.4 x 3.4 x 2.8 cm. Normal testicle with no masses. Normal arterial duplex and normal color flow. Normal epididymis. No hydrocele. Small left-sided varicocele, increases with Valsalva. One vein measures 3 mm in diameter.     IMPRESSION: 1.  Small varicocele on the left, decreased when compared to previous. CORDELIA STEVENS MD   SYSTEM ID:  [id number]      LABS:      Hemoglobin   Date Value Ref Range Status   05/07/2021 15.1 13.3 - 17.7 g/dL Final     Platelet Count   Date Value Ref Range Status   05/07/2021 229 150 - 450 10e9/L Final     INR   Date Value Ref Range Status   05/07/2021 1.05 0.86 - 1.14 Final     Assessment/plan: This is a pleasant 31-year-old male with recurrent left scrotal pain.  The patient initially had a good response to the left spermatic embolization that was performed on 5/7/2021.  Unfortunately the patient's symptoms have returned and have worsened over the last month.   We discussed the results  of his ultrasound that was performed on 8/9/2021.  There continues to be a small varicocele on the left, but it is decreased when compared to previous.  I discussed with the patient that his options are to repeat the venogram with attempt to reembolize the small varicocele.  I also recommended that the patient follow-up with urology to see what their recommendations will be.  We also discussed May Thurner syndrome and  syndrome. The patient would like to proceed with repeat venogram.  He will also reach out to his urologist as well.  Plan: The patient is scheduled for a repeat left gonadal venogram with possible embolization of the left varicocele this will be done at [facility] in the near future.    Total time spent on 8/16/2021 was 30 minutes, including time spent counseling the patient, performing a medically appropriate evaluation, reviewing prior medical history, ordering medications and tests, documenting clinical information in the medical record, and communication of results.     Dr. Bear Ford   Interventional Radiology  Pager# [phone]  [facility]         Ear Star Wedge Flap Text: The defect edges were debeveled with a #15 blade scalpel.  Given the location of the defect and the proximity to free margins (helical rim) an ear star wedge flap was deemed most appropriate.  Using a sterile surgical marker, the appropriate flap was drawn incorporating the defect and placing the expected incisions between the helical rim and antihelix where possible.  The area thus outlined was incised through and through with a #15 scalpel blade.

## 2024-07-06 ENCOUNTER — HEALTH MAINTENANCE LETTER (OUTPATIENT)
Age: 34
End: 2024-07-06

## 2024-09-05 ENCOUNTER — APPOINTMENT (OUTPATIENT)
Dept: GENERAL RADIOLOGY | Facility: CLINIC | Age: 34
End: 2024-09-05
Attending: EMERGENCY MEDICINE
Payer: COMMERCIAL

## 2024-09-05 ENCOUNTER — HOSPITAL ENCOUNTER (EMERGENCY)
Facility: CLINIC | Age: 34
Discharge: HOME | End: 2024-09-05
Attending: EMERGENCY MEDICINE | Admitting: EMERGENCY MEDICINE
Payer: COMMERCIAL

## 2024-09-05 VITALS
TEMPERATURE: 97.9 F | DIASTOLIC BLOOD PRESSURE: 92 MMHG | SYSTOLIC BLOOD PRESSURE: 130 MMHG | RESPIRATION RATE: 16 BRPM | WEIGHT: 240 LBS | HEIGHT: 78 IN | OXYGEN SATURATION: 95 % | BODY MASS INDEX: 27.77 KG/M2 | HEART RATE: 80 BPM

## 2024-09-05 DIAGNOSIS — S67.10XA CRUSH INJURY TO FINGER, INITIAL ENCOUNTER: ICD-10-CM

## 2024-09-05 PROCEDURE — 250N000011 HC RX IP 250 OP 636: Performed by: EMERGENCY MEDICINE

## 2024-09-05 PROCEDURE — 250N000013 HC RX MED GY IP 250 OP 250 PS 637: Performed by: EMERGENCY MEDICINE

## 2024-09-05 PROCEDURE — 90715 TDAP VACCINE 7 YRS/> IM: CPT | Performed by: EMERGENCY MEDICINE

## 2024-09-05 PROCEDURE — 90471 IMMUNIZATION ADMIN: CPT | Performed by: EMERGENCY MEDICINE

## 2024-09-05 PROCEDURE — 99283 EMERGENCY DEPT VISIT LOW MDM: CPT | Mod: 25

## 2024-09-05 PROCEDURE — 12002 RPR S/N/AX/GEN/TRNK2.6-7.5CM: CPT

## 2024-09-05 PROCEDURE — 73140 X-RAY EXAM OF FINGER(S): CPT | Mod: LT

## 2024-09-05 RX ORDER — CEPHALEXIN 500 MG/1
1000 CAPSULE ORAL ONCE
Status: COMPLETED | OUTPATIENT
Start: 2024-09-05 | End: 2024-09-05

## 2024-09-05 RX ORDER — CEPHALEXIN 500 MG/1
500 CAPSULE ORAL 3 TIMES DAILY
Qty: 15 CAPSULE | Refills: 0 | Status: SHIPPED | OUTPATIENT
Start: 2024-09-05 | End: 2024-09-10

## 2024-09-05 RX ORDER — BUPIVACAINE HYDROCHLORIDE 5 MG/ML
INJECTION, SOLUTION EPIDURAL; INTRACAUDAL
Status: DISCONTINUED
Start: 2024-09-05 | End: 2024-09-05 | Stop reason: HOSPADM

## 2024-09-05 RX ADMIN — CEPHALEXIN 1000 MG: 500 CAPSULE ORAL at 13:15

## 2024-09-05 RX ADMIN — CLOSTRIDIUM TETANI TOXOID ANTIGEN (FORMALDEHYDE INACTIVATED), CORYNEBACTERIUM DIPHTHERIAE TOXOID ANTIGEN (FORMALDEHYDE INACTIVATED), BORDETELLA PERTUSSIS TOXOID ANTIGEN (GLUTARALDEHYDE INACTIVATED), BORDETELLA PERTUSSIS FILAMENTOUS HEMAGGLUTININ ANTIGEN (FORMALDEHYDE INACTIVATED), BORDETELLA PERTUSSIS PERTACTIN ANTIGEN, AND BORDETELLA PERTUSSIS FIMBRIAE 2/3 ANTIGEN 0.5 ML: 5; 2; 2.5; 5; 3; 5 INJECTION, SUSPENSION INTRAMUSCULAR at 12:12

## 2024-09-05 ASSESSMENT — COLUMBIA-SUICIDE SEVERITY RATING SCALE - C-SSRS
1. IN THE PAST MONTH, HAVE YOU WISHED YOU WERE DEAD OR WISHED YOU COULD GO TO SLEEP AND NOT WAKE UP?: NO
2. HAVE YOU ACTUALLY HAD ANY THOUGHTS OF KILLING YOURSELF IN THE PAST MONTH?: NO
6. HAVE YOU EVER DONE ANYTHING, STARTED TO DO ANYTHING, OR PREPARED TO DO ANYTHING TO END YOUR LIFE?: NO

## 2024-09-05 ASSESSMENT — ACTIVITIES OF DAILY LIVING (ADL)
ADLS_ACUITY_SCORE: 33
ADLS_ACUITY_SCORE: 35

## 2024-09-05 NOTE — DISCHARGE INSTRUCTIONS
Keep wound clean and dry.  Sutures to be removed in 10 days.  Return with any concern for infection due to is a concern for distal digital nerve injury and air under the skin after crush injury offering prophylactic antibiotics.  Please follow-up with hand surgery if your sensation does not improve distally.  Thanks for your patience today and that be careful splitting logs.

## 2024-09-05 NOTE — ED TRIAGE NOTES
Pt presents for complaint of an injury to the left index finger. States he caught it in a log splitter. Bleeding controlled upon arrival. ABC intact. A&Ox4.     Triage Assessment (Adult)       Row Name 09/05/24 1154          Triage Assessment    Airway WDL WDL        Respiratory WDL    Respiratory WDL WDL        Skin Circulation/Temperature WDL    Skin Circulation/Temperature WDL WDL        Cardiac WDL    Cardiac WDL WDL        Peripheral/Neurovascular WDL    Peripheral Neurovascular WDL WDL        Cognitive/Neuro/Behavioral WDL    Cognitive/Neuro/Behavioral WDL WDL

## 2024-09-05 NOTE — ED NOTES
Emergency Department Technician Wound Irrigation Note:    9/5/2024    1:07 PM      Wound location:  Left second finger    Irrigation Fluid: Normal Saline    Estimated Irrigation Volume (60 mL fluid per cm): 1000 mL    Jordan Martinez    [id number]       NAME:  Joie Grimes  MRN: [id number]   YOB: 1975   Date: 9/23/2022   Age: 56 y.o. Gender: male     COMPLAINT AND PRESENT HISTORY:   Joie Grimes is 56 y.o.,  male, presents for pre-anesthesia/admission testing for ARTHROSCOPIC PARTIAL MEDIAL  MENISECTOMY per Dr. Natalie Argueta. Primary dx: MEDIAL MENISCUS TEAR LEFT KNEE.    HPI:SEE PORTION OF NOTE BELOW PER DR. Mclain, 7/27/22 (REVIEWED): This is a 56 y.o. male who presents to the clinic today for evaluation / follow up of left knee pain. Patient 72-year-old gentleman who is employed as a  does a lot of climbing up and down ladders as well as crawling around on his knees with pain. He has noted a couple episodes where his knee became swollen on him. He cannot describe the single incident that caused him to start this but he is describing intermittent catching locking giving sensation to his knee. .     Pt reports his knee has been bothering him for the 2-3 past years. No known injury, states it has been getting increasingly worse over the past several months. States he noted swelling/fluid on his knee. States it locks up on occasion, and bothers him at work when he is going up and down a ladder. Testing completed r/t condition:  XR Knee Left 7/27/22:  Result Date: 7/27/2022  Stay today reviewed by me show standing AP both knees and a lateral left knee. Patient is relatively unremarkable. Knees. He has well-maintained joint spaces on both the medial and lateral side of the left knee which is some mild subchondral sclerosis. Lateral view of the left knee is unremarkable as well. No acute process noted    Review of additional significant medical hx:  Palpitations 2020- Myocardial spect rest exercise  3/3/2020:  1. Equivocal small mid-basal inferior wall infarct. 2. No scintigraphic evidence for reversible ischemia. 3. Left ventricular ejection fraction of 51%.   4. Please see report for EKG portion of the examination which will be  performed separately by physician from cardiology. Risk stratification:  Low risk    Activity level:  Functional Capacity per patient:              1. Patient is able to walk 2 city blocks on level ground without SOB. 2. Patient is able to climb 2 flights of stairs without SOB. Denies hx of MRSA infection. Denies hx of blood clots. Denies hx of any personal or family hx of complications w/anesthesia. PAST MEDICAL HISTORY     Past Medical History:   Diagnosis Date    Allergies     Palpitations     stress test 3/2020, negative     SURGICAL HISTORY       Past Surgical History:   Procedure Laterality Date    WISDOM TOOTH EXTRACTION       SOCIAL HISTORY       Social History     Socioeconomic History    Marital status:      Spouse name: None    Number of children: None    Years of education: None    Highest education level: None   Tobacco Use    Smoking status: Former    Smokeless tobacco: Current     Types: Chew   Vaping Use    Vaping Use: Never used   Substance and Sexual Activity    Alcohol use:  Yes     Alcohol/week: 22.0 standard drinks     Types: 12 Cans of beer, 10 Shots of liquor per week     Comment: social/daily    Drug use: No    Sexual activity: Yes     Partners: Female     Social Determinants of Health     Financial Resource Strain: Low Risk     Difficulty of Paying Living Expenses: Not hard at all   Food Insecurity: No Food Insecurity    Worried About [facility][1] in the Last Year: Never true    Ran Out of Food in the Last Year: Never true       REVIEW OF SYSTEMS      Allergies   Allergen Reactions    Bee Venom     Pcn [Penicillins] Other (See Comments)     Unknown reaction per pt       Current Outpatient Medications on File Prior to Encounter   Medication Sig Dispense Refill    Multiple Vitamins-Minerals (THERAPEUTIC MULTIVITAMIN-MINERALS) tablet Take 1 tablet by mouth daily      loratadine (CLARITIN) 10 MG capsule Take 10 mg by mouth daily      b complex vitamins capsule Take 1 capsule by mouth daily      Cholecalciferol (VITAMIN D) 50 MCG (2000 UT) CAPS capsule Take by mouth daily      ibuprofen (ADVIL;MOTRIN) 400 MG tablet Take 1 tablet by mouth every 6 hours as needed for Pain 120 tablet 0     No current facility-administered medications on file prior to encounter. Review of Systems   Constitutional:  Negative for chills and fever. HENT:  Negative for congestion, ear pain, postnasal drip, rhinorrhea, sore throat and trouble swallowing. Eyes:  Positive for itching. Respiratory:  Negative for cough, shortness of breath and wheezing. Cardiovascular:  Negative for chest pain, palpitations and leg swelling. Gastrointestinal:  Negative for abdominal pain, anal bleeding, blood in stool, constipation, diarrhea, nausea and vomiting. Genitourinary:  Negative for dysuria and frequency. Musculoskeletal:  Positive for arthralgias, gait problem (occasional antalgic gait on bad days) and joint swelling. Skin:  Negative for rash. Allergic/Immunologic: Positive for environmental allergies. Neurological:  Negative for dizziness and headaches. Hematological:  Does not bruise/bleed easily. GENERAL PHYSICAL EXAM     Vitals: BP (!) 153/87   Pulse 55   Temp 98.2 °F (36.8 °C)   Resp 18   Ht 6' (1.829 m)   Wt 180 lb (81.6 kg)   SpO2 100%   BMI 24.41 kg/m²               Physical Exam  Constitutional:       General: He is not in acute distress. Appearance: He is normal weight. HENT:      Head: Normocephalic. Nose: Nose normal.      Mouth/Throat:      Mouth: Mucous membranes are moist.      Pharynx: Oropharynx is clear. Eyes:      Extraocular Movements: Extraocular movements intact. Pupils: Pupils are equal, round, and reactive to light. Cardiovascular:      Rate and Rhythm: Normal rate and regular rhythm.    Pulmonary:      Effort: Pulmonary effort is normal.      Breath sounds: Normal breath sounds. Abdominal:      General: Abdomen is flat. Bowel sounds are normal.   Musculoskeletal:         General: Normal range of motion. Cervical back: Normal range of motion. Left knee: Effusion present. Tenderness present. Skin:     General: Skin is warm and dry. Findings: No bruising. Neurological:      General: No focal deficit present. Mental Status: He is alert and oriented to person, place, and time. Mental status is at baseline. Psychiatric:         Mood and Affect: Mood normal.       LAB REVIEW     Lab Results   Component Value Date    WBC 4.3 09/23/2022    HGB 13.8 09/23/2022    HCT 40.0 (L) 09/23/2022    MCV 90.7 09/23/2022     09/23/2022      Lab Results   Component Value Date/Time     09/23/2022 07:30 AM    K 4.7 09/23/2022 07:30 AM     09/23/2022 07:30 AM    CO2 31 09/23/2022 07:30 AM    BUN 17 09/23/2022 07:30 AM    CREATININE 0.82 09/23/2022 07:30 AM    GLUCOSE 111 09/23/2022 07:30 AM    GLUCOSE 121 12/27/2011 06:54 PM    CALCIUM 9.7 09/23/2022 07:30 AM       PRELIMINARY EKG REVIEW, DATE: 9/23/22   Sinus yolanda cardia with sinus arrhythmia  Otherwise normal ECG   SURGERY / PROVISIONAL DIAGNOSES:      ARTHROSCOPIC PARTIAL MEDIAL  MENISECTOMY    MEDIAL MENISCUS TEAR LEFT KNEE    Patient Active Problem List    Diagnosis Date Noted    Preop examination 09/22/2022    History of COVID-19 11/02/2021    Intermittent palpitations 05/07/2020    Hyperlipidemia LDL goal <100 05/07/2020    Smokeless tobacco use 03/03/2020    History of cigarette smoking 03/03/2020    Pressure in left side of chest 03/03/2020           CLEARANCE: Based on my personal evaluation of patient including review of patient's chart, no clearance required for scheduled surgery.      Total time spent on encounter- PAT provider minutes: 31-40 minutes     [facility] [facility] on 9/23/2022 at 7:37 AM

## 2024-09-05 NOTE — ED PROVIDER NOTES
"  Emergency Department Note      History of Present Illness     Chief Complaint   Hand Pain      HPI   Royal Hernadez is a 34 year old male is a right hand dominant male that presents with left second digit injury.  Patient is 34 years old and otherwise healthy patient was at home doing chores including splitting logs due to recent fall and trees.  Patient crushed his left index finger and presents to the emergency room for assessment.  Last tetanus shot unknown.  No other injury noted.    Independent Historian   None    Review of External Notes       Past Medical History     Medical History and Problem List   Past Medical History:   Diagnosis Date    Anxiety     Gastro-oesophageal reflux disease        Medications   cephALEXin (KEFLEX) 500 MG capsule  cetirizine (ZYRTEC) 10 MG tablet  levalbuterol (XOPENEX HFA) 45 MCG/ACT inhaler  levocetirizine (XYZAL) 5 MG tablet  mometasone (NASONEX) 50 MCG/ACT nasal spray  Multiple Vitamins-Minerals (MULTIVITAMIN GUMMIES MENS) CHEW  Omega-3 Fatty Acids (FISH OIL) 500 MG CAPS  Vitamin D, Cholecalciferol, 25 MCG (1000 UT) CAPS        Surgical History   Past Surgical History:   Procedure Laterality Date    HERNIA REPAIR      at age 15    IR SPERMATIC VEIN EMBOLIZATION  5/7/2021    IR SPERMATIC VEIN EMBOLIZATION  8/25/2021       Physical Exam     Patient Vitals for the past 24 hrs:   BP Temp Pulse Resp SpO2 Height Weight   09/05/24 1318 -- -- -- 16 -- -- --   09/05/24 1305 (!) 130/92 -- 80 -- 95 % -- --   09/05/24 1154 (!) 144/97 97.9  F (36.6  C) 74 16 95 % 2.007 m (6' 7\") 108.9 kg (240 lb)     Physical Exam  Vitals reviewed.   Cardiovascular:      Rate and Rhythm: Normal rate.   Pulmonary:      Effort: Pulmonary effort is normal.   Musculoskeletal:      Comments: Left index finger.  There is bruising like discoloration of the pad of the second digit.  There is a horizontal 1 cm laceration at the DIP level.  There is slightly decreased sensation distally over the ulnar side of the " distal second digit.  There is a 2 cm long jagged laceration over the ulnar aspect of the distal second digit.  No subungual hematoma.  No nail avulsion or nail no nailbed injury.   Skin:     General: Skin is warm.   Neurological:      Mental Status: He is alert.           Diagnostics     Lab Results   Labs Ordered and Resulted from Time of ED Arrival to Time of ED Departure - No data to display    Imaging   Fingers XR, 2-3 views, left   Final Result   IMPRESSION:      Soft tissue defect at the volar aspect of the index finger near the   middle phalangeal head. No evidence of fracture or dislocation.       ELIZ CHRISTOPHER MD            SYSTEM ID:  [id number]          Independent Interpretation   X-ray finger shows no fracture    ED Course      Medications Administered   Medications   Tdap (tetanus-diphtheria-acell pertussis) (ADACEL) injection 0.5 mL (0.5 mLs Intramuscular $Given 9/5/24 1212)   cephALEXin (KEFLEX) capsule 1,000 mg (1,000 mg Oral $Given 9/5/24 1315)       Procedures   [facility]    -Laceration Repair    Date/Time: 9/5/2024 4:30 PM    Performed by: Matt Verdugo MD  Authorized by: Matt Verdugo MD    Emergent condition/consent implied      ANESTHESIA (see MAR for exact dosages):     Anesthesia method:  Nerve block    Block needle gauge:  27 G    Block anesthetic:  Bupivacaine 0.5% w/o epi  LACERATION DETAILS     Location:  Finger    Finger location:  L index finger    Length (cm):  3    REPAIR TYPE:     Repair type:  Simple    EXPLORATION:     Contaminated: no      TREATMENT:     Area cleansed with:  Shur-Clens and saline    Amount of cleaning:  Standard    Irrigation solution:  Sterile saline    SKIN REPAIR     Repair method:  Sutures    Suture size:  5-0    Suture material:  Nylon    Suture technique:  Simple interrupted    Number of sutures:  8    APPROXIMATION     Approximation:  Loose    POST-PROCEDURE DETAILS     Dressing:  Antibiotic ointment and  non-adherent dressing      PROCEDURE    Patient Tolerance:  Patient tolerated the procedure well with no immediate complications       Discussion of Management   None    ED Course        Additional Documentation  None    Medical Decision Making / Diagnosis     CMS Diagnoses:   and None    MIPS       None    MDM   Edlilibeth Hernadez is a 34 year old male presents with crush injury to the distal second digit.  There is slightly distal distal sensation decreased but able to flex the finger no concerns for DIP or PIP flexor tendon injury.  Wounds were repaired by me.  X-rays negative for fracture.  Offered prophylactic antibiotics due to concerns for digital nerve injury and follow-up with orthopedics.  Patient was advised that likely lacerated laceration could have injured nailbed but unlikely to be repaired due to the distal nature of the injury but encouraged follow-up with hand surgery for reassessment discharged home in stable condition.    Disposition   The patient was discharged.     Diagnosis     ICD-10-CM    1. Crush injury to finger, initial encounter  S67.10XA            Discharge Medications   Discharge Medication List as of 9/5/2024  1:02 PM        START taking these medications    Details   cephALEXin (KEFLEX) 500 MG capsule Take 1 capsule (500 mg) by mouth 3 times daily for 5 days., Disp-15 capsule, R-0, E-Prescribe               MD Lucina Méndez Brian Samuel, MD  09/05/24 3507

## 2025-07-13 ENCOUNTER — HEALTH MAINTENANCE LETTER (OUTPATIENT)
Age: 35
End: 2025-07-13

## (undated) RX ORDER — FENTANYL CITRATE 50 UG/ML
INJECTION, SOLUTION INTRAMUSCULAR; INTRAVENOUS
Status: DISPENSED
Start: 2021-05-07

## (undated) RX ORDER — FENTANYL CITRATE 50 UG/ML
INJECTION, SOLUTION INTRAMUSCULAR; INTRAVENOUS
Status: DISPENSED
Start: 2021-08-25

## (undated) RX ORDER — LIDOCAINE HYDROCHLORIDE 10 MG/ML
INJECTION, SOLUTION INFILTRATION; PERINEURAL
Status: DISPENSED
Start: 2021-08-25

## (undated) RX ORDER — HEPARIN SODIUM 200 [USP'U]/100ML
INJECTION, SOLUTION INTRAVENOUS
Status: DISPENSED
Start: 2021-05-07

## (undated) RX ORDER — LIDOCAINE HYDROCHLORIDE 10 MG/ML
INJECTION, SOLUTION INFILTRATION; PERINEURAL
Status: DISPENSED
Start: 2021-05-07

## (undated) RX ORDER — ATROPINE SULFATE 0.1 MG/ML
INJECTION INTRAVENOUS
Status: DISPENSED
Start: 2021-08-25

## (undated) RX ORDER — ACETAMINOPHEN 500 MG
TABLET ORAL
Status: DISPENSED
Start: 2021-05-07

## (undated) RX ORDER — HEPARIN SODIUM 200 [USP'U]/100ML
INJECTION, SOLUTION INTRAVENOUS
Status: DISPENSED
Start: 2021-08-25

## (undated) RX ORDER — NITROGLYCERIN 5 MG/ML
VIAL (ML) INTRAVENOUS
Status: DISPENSED
Start: 2021-08-25

## (undated) RX ORDER — ACETAMINOPHEN 325 MG/1
TABLET ORAL
Status: DISPENSED
Start: 2021-08-25